# Patient Record
Sex: MALE | Race: WHITE | NOT HISPANIC OR LATINO | Employment: FULL TIME | URBAN - NONMETROPOLITAN AREA
[De-identification: names, ages, dates, MRNs, and addresses within clinical notes are randomized per-mention and may not be internally consistent; named-entity substitution may affect disease eponyms.]

---

## 2017-11-23 ENCOUNTER — HOSPITAL ENCOUNTER (EMERGENCY)
Facility: HOSPITAL | Age: 52
Discharge: HOME | End: 2017-11-23
Attending: FAMILY MEDICINE | Admitting: FAMILY MEDICINE

## 2017-11-23 VITALS
OXYGEN SATURATION: 99 % | HEIGHT: 70 IN | RESPIRATION RATE: 18 BRPM | WEIGHT: 220 LBS | TEMPERATURE: 98 F | SYSTOLIC BLOOD PRESSURE: 140 MMHG | DIASTOLIC BLOOD PRESSURE: 90 MMHG | HEART RATE: 70 BPM | BODY MASS INDEX: 31.5 KG/M2

## 2017-11-23 DIAGNOSIS — R04.0 ANTERIOR EPISTAXIS: Primary | ICD-10-CM

## 2017-11-23 PROCEDURE — 99283 EMERGENCY DEPT VISIT LOW MDM: CPT

## 2017-11-23 RX ORDER — ASPIRIN 81 MG/1
81 TABLET ORAL DAILY
COMMUNITY

## 2017-11-23 RX ORDER — LISINOPRIL 5 MG/1
20 TABLET ORAL 2 TIMES DAILY
COMMUNITY

## 2017-11-23 RX ORDER — PRASUGREL 10 MG/1
10 TABLET, FILM COATED ORAL DAILY
Status: ON HOLD | COMMUNITY
End: 2018-07-17

## 2017-11-23 RX ORDER — OXYMETAZOLINE HYDROCHLORIDE 0.05 G/100ML
1 SPRAY NASAL ONCE
Status: COMPLETED | OUTPATIENT
Start: 2017-11-23 | End: 2017-11-23

## 2017-11-23 RX ORDER — NITROGLYCERIN 0.4 MG/1
0.4 TABLET SUBLINGUAL
COMMUNITY

## 2017-11-23 RX ADMIN — OXYMETAZOLINE HYDROCHLORIDE 1 SPRAY: 5 SPRAY NASAL at 13:15

## 2017-11-23 NOTE — ED PROVIDER NOTES
"Subjective   Patient is a 52 y.o. male presenting with nosebleeds.   History provided by:  Patient   used: No    Nose Bleed   Location:  L nare  Severity:  Moderate  Timing:  Constant  Progression:  Improving  Chronicity:  Recurrent  Context: aspirin use and hypertension    Context: not bleeding disorder and not nose picking    Relieved by:  Nothing  Worsened by:  Nothing  Ineffective treatments:  None tried  Associated symptoms: sneezing        Review of Systems   HENT: Positive for nosebleeds and sneezing.    All other systems reviewed and are negative.      Past Medical History:   Diagnosis Date   • Hyperlipidemia    • Hypertension        Allergies   Allergen Reactions   • Statins        History reviewed. No pertinent surgical history.    History reviewed. No pertinent family history.    Social History     Social History   • Marital status:      Spouse name: N/A   • Number of children: N/A   • Years of education: N/A     Social History Main Topics   • Smoking status: Never Smoker   • Smokeless tobacco: Never Used   • Alcohol use No   • Drug use: No   • Sexual activity: Defer     Other Topics Concern   • None     Social History Narrative   • None     /90 (BP Location: Left arm, Patient Position: Sitting)  Pulse 70  Temp 98 °F (36.7 °C) (Oral)   Resp 18  Ht 70\" (177.8 cm)  Wt 220 lb (99.8 kg)  SpO2 99%  BMI 31.57 kg/m2      Objective   Physical Exam   Constitutional: He is oriented to person, place, and time. He appears well-developed and well-nourished.   HENT:   Head: Normocephalic and atraumatic.   Eyes: EOM are normal. Pupils are equal, round, and reactive to light.   Neck: Normal range of motion. Neck supple.   Cardiovascular: Normal rate, regular rhythm and S1 normal.    Pulmonary/Chest: Effort normal and breath sounds normal.   Abdominal: Soft. Bowel sounds are normal.   Musculoskeletal: Normal range of motion.   Neurological: He is alert and oriented to person, place, " and time. He has normal reflexes.   Nursing note and vitals reviewed.      Procedures         ED Course  ED Course      Afrin was spray on patient left nostril and apply pressure . Patient was observed for 1 hr and bleeding stopped.            MDM    Final diagnoses:   Anterior epistaxis            James Howard MD  11/23/17 2441

## 2018-07-17 ENCOUNTER — HOSPITAL ENCOUNTER (OUTPATIENT)
Facility: HOSPITAL | Age: 53
Setting detail: OBSERVATION
Discharge: HOME | End: 2018-07-18
Attending: HOSPITALIST | Admitting: HOSPITALIST

## 2018-07-17 PROBLEM — I25.10 CAD (CORONARY ARTERY DISEASE): Status: ACTIVE | Noted: 2018-07-17

## 2018-07-17 PROBLEM — R07.9 CHEST PAIN: Status: ACTIVE | Noted: 2018-07-17

## 2018-07-17 PROBLEM — E11.9 TYPE 2 DIABETES MELLITUS: Status: ACTIVE | Noted: 2018-07-17

## 2018-07-17 PROBLEM — I10 HTN (HYPERTENSION): Status: ACTIVE | Noted: 2018-07-17

## 2018-07-17 PROBLEM — E78.5 HLD (HYPERLIPIDEMIA): Status: ACTIVE | Noted: 2018-07-17

## 2018-07-17 LAB
GLUCOSE BLDC GLUCOMTR-MCNC: 128 MG/DL (ref 70–130)
GLUCOSE BLDC GLUCOMTR-MCNC: 208 MG/DL (ref 70–130)

## 2018-07-17 PROCEDURE — G0378 HOSPITAL OBSERVATION PER HR: HCPCS

## 2018-07-17 PROCEDURE — 96372 THER/PROPH/DIAG INJ SC/IM: CPT

## 2018-07-17 PROCEDURE — 25010000002 ENOXAPARIN PER 10 MG: Performed by: HOSPITALIST

## 2018-07-17 PROCEDURE — 82962 GLUCOSE BLOOD TEST: CPT

## 2018-07-17 PROCEDURE — 63710000001 INSULIN ASPART PER 5 UNITS: Performed by: HOSPITALIST

## 2018-07-17 PROCEDURE — 99214 OFFICE O/P EST MOD 30 MIN: CPT | Performed by: INTERNAL MEDICINE

## 2018-07-17 RX ORDER — DIPHENHYDRAMINE HYDROCHLORIDE 50 MG/ML
25 INJECTION INTRAMUSCULAR; INTRAVENOUS ONCE
Status: COMPLETED | OUTPATIENT
Start: 2018-07-18 | End: 2018-07-18

## 2018-07-17 RX ORDER — ATORVASTATIN CALCIUM 20 MG/1
20 TABLET, FILM COATED ORAL NIGHTLY
Status: DISCONTINUED | OUTPATIENT
Start: 2018-07-17 | End: 2018-07-18 | Stop reason: HOSPADM

## 2018-07-17 RX ORDER — METOPROLOL TARTRATE 5 MG/5ML
2.5 INJECTION INTRAVENOUS ONCE AS NEEDED
Status: DISCONTINUED | OUTPATIENT
Start: 2018-07-18 | End: 2018-07-18 | Stop reason: HOSPADM

## 2018-07-17 RX ORDER — ONDANSETRON 2 MG/ML
4 INJECTION INTRAMUSCULAR; INTRAVENOUS EVERY 6 HOURS PRN
Status: DISCONTINUED | OUTPATIENT
Start: 2018-07-17 | End: 2018-07-18 | Stop reason: HOSPADM

## 2018-07-17 RX ORDER — ONDANSETRON 4 MG/1
4 TABLET, FILM COATED ORAL EVERY 6 HOURS PRN
Status: DISCONTINUED | OUTPATIENT
Start: 2018-07-17 | End: 2018-07-18 | Stop reason: HOSPADM

## 2018-07-17 RX ORDER — SODIUM CHLORIDE 0.9 % (FLUSH) 0.9 %
1-10 SYRINGE (ML) INJECTION AS NEEDED
Status: DISCONTINUED | OUTPATIENT
Start: 2018-07-17 | End: 2018-07-18 | Stop reason: HOSPADM

## 2018-07-17 RX ORDER — ASPIRIN 81 MG/1
81 TABLET ORAL DAILY
Status: DISCONTINUED | OUTPATIENT
Start: 2018-07-18 | End: 2018-07-18 | Stop reason: HOSPADM

## 2018-07-17 RX ORDER — NICOTINE POLACRILEX 4 MG
15 LOZENGE BUCCAL
Status: DISCONTINUED | OUTPATIENT
Start: 2018-07-17 | End: 2018-07-18 | Stop reason: HOSPADM

## 2018-07-17 RX ORDER — DEXTROSE MONOHYDRATE 25 G/50ML
25 INJECTION, SOLUTION INTRAVENOUS
Status: DISCONTINUED | OUTPATIENT
Start: 2018-07-17 | End: 2018-07-18 | Stop reason: HOSPADM

## 2018-07-17 RX ORDER — LISINOPRIL 10 MG/1
10 TABLET ORAL 2 TIMES DAILY
Status: DISCONTINUED | OUTPATIENT
Start: 2018-07-17 | End: 2018-07-18 | Stop reason: HOSPADM

## 2018-07-17 RX ORDER — NALOXONE HCL 0.4 MG/ML
0.4 VIAL (ML) INJECTION
Status: DISCONTINUED | OUTPATIENT
Start: 2018-07-17 | End: 2018-07-18 | Stop reason: HOSPADM

## 2018-07-17 RX ORDER — PANTOPRAZOLE SODIUM 40 MG/1
40 TABLET, DELAYED RELEASE ORAL
Status: DISCONTINUED | OUTPATIENT
Start: 2018-07-18 | End: 2018-07-18 | Stop reason: HOSPADM

## 2018-07-17 RX ORDER — ACETAMINOPHEN 325 MG/1
650 TABLET ORAL EVERY 4 HOURS PRN
Status: DISCONTINUED | OUTPATIENT
Start: 2018-07-17 | End: 2018-07-18 | Stop reason: HOSPADM

## 2018-07-17 RX ORDER — ALUMINA, MAGNESIA, AND SIMETHICONE 2400; 2400; 240 MG/30ML; MG/30ML; MG/30ML
15 SUSPENSION ORAL EVERY 6 HOURS PRN
Status: DISCONTINUED | OUTPATIENT
Start: 2018-07-17 | End: 2018-07-18 | Stop reason: HOSPADM

## 2018-07-17 RX ORDER — ATORVASTATIN CALCIUM 20 MG/1
20 TABLET, FILM COATED ORAL NIGHTLY
COMMUNITY

## 2018-07-17 RX ORDER — NITROGLYCERIN 0.4 MG/1
0.4 TABLET SUBLINGUAL
Status: DISCONTINUED | OUTPATIENT
Start: 2018-07-17 | End: 2018-07-18 | Stop reason: HOSPADM

## 2018-07-17 RX ORDER — ONDANSETRON 4 MG/1
4 TABLET, ORALLY DISINTEGRATING ORAL EVERY 6 HOURS PRN
Status: DISCONTINUED | OUTPATIENT
Start: 2018-07-17 | End: 2018-07-18 | Stop reason: HOSPADM

## 2018-07-17 RX ORDER — MORPHINE SULFATE 2 MG/ML
1 INJECTION, SOLUTION INTRAMUSCULAR; INTRAVENOUS EVERY 4 HOURS PRN
Status: DISCONTINUED | OUTPATIENT
Start: 2018-07-17 | End: 2018-07-18 | Stop reason: HOSPADM

## 2018-07-17 RX ADMIN — ENOXAPARIN SODIUM 40 MG: 100 INJECTION SUBCUTANEOUS at 16:09

## 2018-07-17 RX ADMIN — LISINOPRIL 10 MG: 10 TABLET ORAL at 20:52

## 2018-07-17 RX ADMIN — INSULIN ASPART 3 UNITS: 100 INJECTION, SOLUTION INTRAVENOUS; SUBCUTANEOUS at 20:54

## 2018-07-17 RX ADMIN — TICAGRELOR 90 MG: 90 TABLET ORAL at 20:52

## 2018-07-17 NOTE — CONSULTS
Cardiology at [facility]  Cardiovascular Consultation Note      Jose Isaac  304/1  [id number]  1965    DATE OF ADMISSION: 7/17/2018  DATE OF CONSULTATION:  7/17/2018    Anyi Orozco DO  Treatment Team:   Attending Provider: Glenn Yadav MD  Consulting Physician: Franko Mladonado MD  Admitting Provider: Glenn Yadav MD    No chief complaint on file.      Chief complaint/ Reason for Consultation: Chest pain consistent with angina      History of Present Illness  58 years old patient with a BMI 34.6 with history of hypertension, hypertensive heart disease, hyperlipidemia, history of type 2 diabetes, CAD status post PTA stent of RCA in 2015 with a nonobstructive disease in left circumflex system and preserved left ventricle systolic function.  Patient's known to Dr. Galvez and [facility] for evaluation chest pain described as pressure-like feeling 6-7 second of 10 with radiation to the back associated sweating nauseous feeling.  The pain lasted about 7 to 8 minute.  Cardiac enzymes facility reportedly normal.  Patient referred for further evaluation is management.  No orthopnea PND no fever cough which was reported.  No syncope or near syncopal episode or palpitations reported.  EKG also reported to have sinus rhythm without acute ST-T wave changes.  History of intolerance to statin    CONCLUSION:2015  1. Normal left ventricular function with an estimated ejection fraction        of 55% to 60% without regional wall motion abnormalities.  2. Normal right ventricular size with normal function.  3. Normal diastolic function.  4. No significant valvular regurgitation or stenosis.    FINAL IMPRESSION: CATH 10/2015  1.    95% to 99% stenosis in the mid right coronary artery, which was        the culprit vessel.  2.    Luminal irregularity in the circumflex and the diagonal branch.  3.    Preserved left ventricular systolic function with an ejection        fraction  of 55%.  FINAL IMPRESSION:  Successful PTCA and stenting of the right coronary  artery was done with deployment of a 3.5 mm x 18 mm Xpedition stent  with reduction of stenosis from 95% to less than 0% stenosis with good  MALINDA-3 flow.    CARDIAC RISK FACTORS:  1.    Male.  2.    Obesity.  3.    Arterial hypertension.  4.    Hyperlipidemia.  5.    Diabetes.  6.    Family history for coronary artery disease.    Past Medical History:   Diagnosis Date   • Diabetes (CMS/HCC)    • Hyperlipidemia    • Hypertension        Past Surgical History:   Procedure Laterality Date   • CHOLECYSTECTOMY     • CORONARY ANGIOPLASTY WITH STENT PLACEMENT         Allergies   Allergen Reactions   • Statins Unknown (See Comments)     Taking lipitor       No current facility-administered medications on file prior to encounter.      Current Outpatient Prescriptions on File Prior to Encounter   Medication Sig Dispense Refill   • aspirin 81 MG EC tablet Take 81 mg by mouth Daily.     • lisinopril (PRINIVIL,ZESTRIL) 5 MG tablet Take 10 mg by mouth 2 (Two) Times a Day.     • metoprolol tartrate (LOPRESSOR) 25 MG tablet Take 25 mg by mouth Daily.     • nitroglycerin (NITROSTAT) 0.4 MG SL tablet Place 0.4 mg under the tongue Every 5 (Five) Minutes As Needed for Chest Pain. Take no more than 3 doses in 15 minutes.     • SITagliptin-MetFORMIN HCl ER (JANUMET XR) 100-1000 MG tablet sustained-release 24 hour Take 1 tablet by mouth Daily.     • [DISCONTINUED] prasugrel (EFFIENT) 10 MG tablet Take 10 mg by mouth Daily.         Social History     Social History   • Marital status:      Spouse name: N/A   • Number of children: N/A   • Years of education: N/A     Occupational History   • Not on file.     Social History Main Topics   • Smoking status: Never Smoker   • Smokeless tobacco: Never Used   • Alcohol use No   • Drug use: No   • Sexual activity: Defer     Other Topics Concern   • Not on file     Social History Narrative   • No narrative on file  "          REVIEW OF SYSTEMS:   ROS  See history of present illness and past medical history.  Patient denies headache, dizziness, syncope, falls, trauma, change in vision, change in hearing, change in taste, changes in weight, changes in appetite, focal weakness, numbness, or paresthesia.  Patient denies  palpitations,  orthopnea, PND, cough, sinus pressure, rhinorrhea, epistaxis, hemoptysis, hematemesis, diarrhea, constipation or hematchezia.  Denies cold or heat intolerance, polydipsia, polyuria, polyphagia. Denies hematuria, pyuria, dysuria, hesitancy, frequency or urgency.  Denies fever, chills,  night sweats.   Remainder of ROS is negative     Objective:     Vitals:    07/17/18 1311 07/17/18 1406 07/17/18 1640   BP: 143/69     BP Location: Right arm     Patient Position: Sitting     Pulse: 70  81   Resp: 20     Temp:  98.3 °F (36.8 °C)    TempSrc:  Oral    SpO2: 98%     Weight: 109 kg (241 lb 6.4 oz)     Height: 177.8 cm (70\")       Body mass index is 34.64 kg/m².  Flowsheet Rows      First Filed Value   Admission Height  177.8 cm (70\") Documented at 07/17/2018 1311   Admission Weight  109 kg (241 lb 6.4 oz) Documented at 07/17/2018 1311        No intake or output data in the 24 hours ending 07/17/18 1734      Physical Exam   Physical Exam  Alert, cooperative, no distress, appears stated age    Head:    Normocephalic, without obvious abnormality, atraumatic   Eyes:    PERRL, conjunctiva/corneas clear, EOM's intact, both eyes   Ears:    Normal external ear canals, both ears   Nose:   Nares normal, septum midline, mucosa normal, no drainage    or sinus tenderness   Throat:   Lips, mucosa, and tongue normal   Neck:   Supple, symmetrical, trachea midline, no adenopathy;     thyroid:  no enlargement/tenderness/nodules; no carotid    bruit or JVD   Back:     Symmetric, no curvature, ROM normal, no CVA tenderness   Lungs:     Clear to auscultation bilaterally, respirations unlabored   Chest Wall:    No tenderness or " deformity    Heart:    Regular rate and rhythm, S1 and S2 normal, no murmur, rub   or gallop   Abdomen:     Soft, non-tender, bowel sounds active all four quadrants,     no masses, no hepatomegaly, no splenomegaly   Extremities:   Extremities normal, atraumatic, no cyanosis or edema   Pulses:   2+ and symmetric all extremities   Skin:   Skin color, texture, turgor normal, no rashes or lesions   Lymph nodes:   Cervical, supraclavicular, and axillary nodes normal   Neurologic:   CNII-XII intact, normal strength, sensation intact throughout       Radiology Review    No orders to display       Lab Results:  Lab Results (last 24 hours)     Procedure Component Value Units Date/Time    POC Glucose Once [[id number]]  (Normal) Collected:  07/17/18 1549    Specimen:  Blood Updated:  07/17/18 1602     Glucose 128 mg/dL      Comment: RN NotifiedMeter: [id number]: [id number] CHARLY KIRAN I personally viewed and interpreted the patient's EKG/Telemetry data       Assessment/Plan:     Chest pain consistent with angina #2 CAD status post PTCA stent of RCA with luminal irregularity of left circumflex system #3 hypertension with hypertensive heart disease #4 hyperlipidemia #5 diabetes and obesity with BMI 34 and metabolic syndrome  58 years old patient with history of metabolic syndrome, hypertension, hyperlipidemia, diabetes, documented history of CAD status post PTA stent in 2015 showed edema today for evaluation chest pain consistent with angina with the symptom of diaphoresis nauseous and radiation to the left upper extremity.  EKG and cardiac enzymes were reported normal.  Given the clinical description and risk factor and seemed appropriate to further risk stratify with a CT coronary angiogram.  Pros and cons of this option discussed with the patient and family.  Understand willing to proceed forward.    Risk factor lifestyle modification discussed.  Given the metabolic syndrome, Dashdiet, low  carbohydrate and low fat diet discussed.  At present patient is hemodynamically stable and being treated with atorvastatin for management of hyperlipidemia, Brilinta And aspirin with history of bradycardia stent of RCA and to done in 2015, lisinopril for management of hypertension with hypertensive heart disease good blood pressure control and metoprolol with history of  CAD .  Further recommendation based on the outcome of CT coronary angiogram and patient clinical conditions.       Thank you for allowing me to participate in the care of Jose Isaac. Feel free to contact me directly with any further questions or concerns.    Franko Maldonado MD  07/17/18  5:34 PM.      EMR Dragon/Transcription disclaimer:   Much of this encounter note is an electronic transcription/translation of spoken language to printed text. The electronic translation of spoken language may permit erroneous, or at times, nonsensical words or phrases to be inadvertently transcribed; Although I have reviewed the note for such errors, some may still exist.

## 2018-07-17 NOTE — PLAN OF CARE
Problem: Patient Care Overview  Goal: Plan of Care Review  Outcome: Ongoing (interventions implemented as appropriate)   07/17/18 1410   Coping/Psychosocial   Plan of Care Reviewed With patient   Plan of Care Review   Progress no change   OTHER   Outcome Summary initial assessment

## 2018-07-17 NOTE — H&P
HISTORY AND PHYSICAL   [facility]        Patient Identification:  Name: Jose Isaac  Age: 52 y.o.  Sex: male  :  1965  MRN: [id number]                     Primary Care Physician: Anyi Orozco DO    Chief Complaint:  Chest pain    History of Present Illness:          The patient is a 52-year-old white male with history of hypertension, hyperlipidemia, diabetes mellitus and coronary artery disease with history of prior stenting who is admitted with history of having a lot of symptoms of indigestion over the weekend with some nausea and vomiting and reflux symptoms.  The day prior to admission he had some chest discomfort with some radiation into his neck.  It lasted about 5 or 10 minutes and he got a little sweaty.  He went to the emergency room at [facility] in [place] and was held overnight with serial cardiac enzymes being negative.  His EKG did not show any acute changes.  He was transferred for her to [facility] for further evaluation treatment.  He has cardiologist here Dr. Galvez.     Past Medical History:  Past Medical History:   Diagnosis Date   • Diabetes (CMS/[facility])    • Hyperlipidemia    • Hypertension      Past Surgical History:  Past Surgical History:   Procedure Laterality Date   • CHOLECYSTECTOMY     • CORONARY ANGIOPLASTY WITH STENT PLACEMENT        Home Meds:  Prescriptions Prior to Admission   Medication Sig Dispense Refill Last Dose   • aspirin 81 MG EC tablet Take 81 mg by mouth Daily.      • atorvastatin (LIPITOR) 20 MG tablet Take 20 mg by mouth Every Night.      • lisinopril (PRINIVIL,ZESTRIL) 5 MG tablet Take 10 mg by mouth 2 (Two) Times a Day.      • metoprolol tartrate (LOPRESSOR) 25 MG tablet Take 25 mg by mouth Daily.      • nitroglycerin (NITROSTAT) 0.4 MG SL tablet Place 0.4 mg under the tongue Every 5 (Five) Minutes As Needed for Chest Pain. Take no more than 3 doses in 15 minutes.      • SITagliptin-MetFORMIN HCl ER (JANUMET XR)  100-1000 MG tablet sustained-release 24 hour Take 1 tablet by mouth Daily.      • ticagrelor (BRILINTA) 90 MG tablet tablet Take 90 mg by mouth 2 (Two) Times a Day.        CURRENT MEDS    Current Facility-Administered Medications:   •  acetaminophen (TYLENOL) tablet 650 mg, 650 mg, Oral, Q4H PRN, Glenn Yadav MD  •  aluminum-magnesium hydroxide-simethicone (MAALOX MAX) 400-400-40 MG/5ML suspension 15 mL, 15 mL, Oral, Q6H PRN, Glenn Yadav MD  •  [START ON 7/18/2018] aspirin EC tablet 81 mg, 81 mg, Oral, Daily, Glenn Yadav MD  •  atorvastatin (LIPITOR) tablet 20 mg, 20 mg, Oral, Nightly, Glenn Yadav MD  •  dextrose (D50W) solution 25 g, 25 g, Intravenous, Q15 Min PRN, Glenn Yadav MD  •  dextrose (GLUTOSE) oral gel 15 g, 15 g, Oral, Q15 Min PRN, Glenn Yadav MD  •  enoxaparin (LOVENOX) syringe 40 mg, 40 mg, Subcutaneous, Q24H, Glenn Yadav MD  •  glucagon (human recombinant) (GLUCAGEN DIAGNOSTIC) injection 1 mg, 1 mg, Subcutaneous, PRN, Glenn Yadav MD  •  insulin aspart (novoLOG) injection 0-7 Units, 0-7 Units, Subcutaneous, 4x Daily AC & at Bedtime, Glenn Yadav MD  •  [START ON 7/18/2018] linagliptin (TRADJENTA) 5 mg, metFORMIN ER (GLUCOPHAGE-XR) 1,000 mg for JENTADUETO XR 5-1000, , Oral, Daily, Glenn Yadav MD  •  lisinopril (PRINIVIL,ZESTRIL) tablet 10 mg, 10 mg, Oral, BID, Glenn Yadav MD  •  [START ON 7/18/2018] metoprolol tartrate (LOPRESSOR) tablet 25 mg, 25 mg, Oral, Daily, Glenn Yadav MD  •  morphine injection 1 mg, 1 mg, Intravenous, Q4H PRN **AND** naloxone (NARCAN) injection 0.4 mg, 0.4 mg, Intravenous, Q5 Min PRN, Glenn Yadav MD  •  nitroglycerin (NITROSTAT) SL tablet 0.4 mg, 0.4 mg, Sublingual, Q5 Min PRN, Glenn Yadav MD  •  ondansetron (ZOFRAN) tablet 4 mg, 4 mg, Oral, Q6H PRN **OR** ondansetron ODT (ZOFRAN-ODT) disintegrating tablet 4 mg, 4 mg, Oral, Q6H PRN **OR** ondansetron (ZOFRAN) injection 4 mg, 4 mg, Intravenous, Q6H PRN, Glenn Yadav MD  •  [START ON 7/18/2018]  pantoprazole (PROTONIX) EC tablet 40 mg, 40 mg, Oral, Q AM, Glenn Yadav MD  •  sodium chloride 0.9 % flush 1-10 mL, 1-10 mL, Intravenous, PRN, Glenn Yadav MD  •  sodium chloride 0.9 % flush 1-10 mL, 1-10 mL, Intravenous, PRN, Glenn Yadav MD  •  ticagrelor (BRILINTA) tablet 90 mg, 90 mg, Oral, BID, Glenn Yadav MD  Allergies:  Allergies   Allergen Reactions   • Statins Unknown (See Comments)     Taking lipitor     Immunizations:    There is no immunization history on file for this patient.  Social History:   Social History     Social History Narrative   • No narrative on file     Social History     Social History   • Marital status:      Spouse name: N/A   • Number of children: N/A   • Years of education: N/A     Occupational History   • Not on file.     Social History Main Topics   • Smoking status: Never Smoker   • Smokeless tobacco: Never Used   • Alcohol use No   • Drug use: No   • Sexual activity: Defer     Other Topics Concern   • Not on file     Social History Narrative   • No narrative on file       Family History:  History reviewed. No pertinent family history.     Review of Systems  See history of present illness and past medical history.  Patient denies headache, dizziness, syncope, falls, trauma, change in vision, change in hearing, change in taste, changes in weight, changes in appetite, focal weakness, numbness, or paresthesia.  Patient denies  palpitations,  orthopnea, PND, cough, sinus pressure, rhinorrhea, epistaxis, hemoptysis, hematemesis, diarrhea, constipation or hematchezia.  Denies cold or heat intolerance, polydipsia, polyuria, polyphagia. Denies hematuria, pyuria, dysuria, hesitancy, frequency or urgency.  Denies fever, chills,  night sweats.   Remainder of ROS is negative.    Objective:  tMax 24 hrs: Temp (24hrs), Av.3 °F (36.8 °C), Min:98.3 °F (36.8 °C), Max:98.3 °F (36.8 °C)    Vitals Ranges:   Temp:  [98.3 °F (36.8 °C)] 98.3 °F (36.8 °C)  Heart Rate:  [70] 70  Resp:   "[20] 20  BP: (143)/(69) 143/69      Exam:  /69 (BP Location: Right arm, Patient Position: Sitting)   Pulse 70   Temp 98.3 °F (36.8 °C) (Oral)   Resp 20   Ht 177.8 cm (70\")   Wt 109 kg (241 lb 6.4 oz)   SpO2 98%   BMI 34.64 kg/m²     General Appearance:    Alert, cooperative, no distress, appears stated age   Head:    Normocephalic, without obvious abnormality, atraumatic   Eyes:    PERRL, conjunctiva/corneas clear, EOM's intact, both eyes   Ears:    Normal external ear canals, both ears   Nose:   Nares normal, septum midline, mucosa normal, no drainage    or sinus tenderness   Throat:   Lips, mucosa, and tongue normal   Neck:   Supple, symmetrical, trachea midline, no adenopathy;     thyroid:  no enlargement/tenderness/nodules; no carotid    bruit or JVD   Back:     Symmetric, no curvature, ROM normal, no CVA tenderness   Lungs:     Clear to auscultation bilaterally, respirations unlabored   Chest Wall:    No tenderness or deformity    Heart:    Regular rate and rhythm, S1 and S2 normal, no murmur, rub   or gallop   Abdomen:     Soft, non-tender, bowel sounds active all four quadrants,     no masses, no hepatomegaly, no splenomegaly   Extremities:   Extremities normal, atraumatic, no cyanosis or edema   Pulses:   2+ and symmetric all extremities   Skin:   Skin color, texture, turgor normal, no rashes or lesions   Lymph nodes:   Cervical, supraclavicular, and axillary nodes normal   Neurologic:   CNII-XII intact, normal strength, sensation intact throughout      .    Data Review:3 troponins were within normal range, EKG showed sinus rhythm without any acute changes.  Hemoglobin 13.8 hematocrit 40.9 white count 9.7 platelet count 244,000 sodium 137 potassium 4.2 chloride 103B UN 15 creatinine 0.76 glucose 144  Lab Results (last 72 hours)     ** No results found for the last 72 hours. **                   Imaging Results (all)     None        Patient Active Problem List   Diagnosis Code   • Chest pain " R07.9   • HTN (hypertension) I10   • HLD (hyperlipidemia) E78.5   • Type 2 diabetes mellitus (CMS/HCC) E11.9   • CAD (coronary artery disease), hx of stent I25.10       Assessment:  Active Hospital Problems    Diagnosis Date Noted   • **Chest pain [R07.9] 07/17/2018   • HTN (hypertension) [I10] 07/17/2018   • HLD (hyperlipidemia) [E78.5] 07/17/2018   • Type 2 diabetes mellitus (CMS/HCC) [E11.9] 07/17/2018   • CAD (coronary artery disease), hx of stent [I25.10] 07/17/2018      Resolved Hospital Problems    Diagnosis Date Noted Date Resolved   No resolved problems to display.       Plan:  The patient's admitted to the hospital and will consult cardiology for further evaluation.  His pain is somewhat atypical and probably should at least have stress test and if this is negative he may need GI workup with EGD as outpatient.  We'll repeat some more labs and also started on medication for acid suppression for reflux type symptoms.    Glenn Yadav MD  7/17/2018  3:39 PM

## 2018-07-18 ENCOUNTER — APPOINTMENT (OUTPATIENT)
Dept: CT IMAGING | Facility: HOSPITAL | Age: 53
End: 2018-07-18

## 2018-07-18 VITALS
RESPIRATION RATE: 18 BRPM | WEIGHT: 241.8 LBS | OXYGEN SATURATION: 98 % | HEIGHT: 70 IN | BODY MASS INDEX: 34.62 KG/M2 | TEMPERATURE: 98.4 F | DIASTOLIC BLOOD PRESSURE: 72 MMHG | SYSTOLIC BLOOD PRESSURE: 128 MMHG | HEART RATE: 75 BPM

## 2018-07-18 PROBLEM — R07.9 CHEST PAIN: Status: RESOLVED | Noted: 2018-07-17 | Resolved: 2018-07-18

## 2018-07-18 LAB
ANION GAP SERPL CALCULATED.3IONS-SCNC: 7 MMOL/L (ref 5–15)
ARTICHOKE IGE QN: 124 MG/DL (ref 1–129)
BASOPHILS # BLD AUTO: 0.03 10*3/MM3 (ref 0–0.2)
BASOPHILS NFR BLD AUTO: 0.4 % (ref 0–2)
BUN BLD-MCNC: 14 MG/DL (ref 7–21)
BUN/CREAT SERPL: 17.9 (ref 7–25)
CALCIUM SPEC-SCNC: 8.7 MG/DL (ref 8.4–10.2)
CHLORIDE SERPL-SCNC: 104 MMOL/L (ref 95–110)
CHOLEST SERPL-MCNC: 185 MG/DL (ref 0–199)
CO2 SERPL-SCNC: 25 MMOL/L (ref 22–31)
CREAT BLD-MCNC: 0.78 MG/DL (ref 0.7–1.3)
DEPRECATED RDW RBC AUTO: 41.7 FL (ref 35.1–43.9)
EOSINOPHIL # BLD AUTO: 0.19 10*3/MM3 (ref 0–0.7)
EOSINOPHIL NFR BLD AUTO: 2.3 % (ref 0–7)
ERYTHROCYTE [DISTWIDTH] IN BLOOD BY AUTOMATED COUNT: 12.9 % (ref 11.5–14.5)
GFR SERPL CREATININE-BSD FRML MDRD: 105 ML/MIN/1.73 (ref 56–130)
GLUCOSE BLD-MCNC: 121 MG/DL (ref 60–100)
GLUCOSE BLDC GLUCOMTR-MCNC: 119 MG/DL (ref 70–130)
GLUCOSE BLDC GLUCOMTR-MCNC: 224 MG/DL (ref 70–130)
HBA1C MFR BLD: 7.2 % (ref 4–5.6)
HCT VFR BLD AUTO: 38.9 % (ref 39–49)
HDLC SERPL-MCNC: 35 MG/DL (ref 60–200)
HGB BLD-MCNC: 13.2 G/DL (ref 13.7–17.3)
IMM GRANULOCYTES # BLD: 0.08 10*3/MM3 (ref 0–0.02)
IMM GRANULOCYTES NFR BLD: 1 % (ref 0–0.5)
LDLC/HDLC SERPL: 3.65 {RATIO} (ref 0–3.55)
LYMPHOCYTES # BLD AUTO: 1.74 10*3/MM3 (ref 0.6–4.2)
LYMPHOCYTES NFR BLD AUTO: 20.8 % (ref 10–50)
MCH RBC QN AUTO: 29.7 PG (ref 26.5–34)
MCHC RBC AUTO-ENTMCNC: 33.9 G/DL (ref 31.5–36.3)
MCV RBC AUTO: 87.6 FL (ref 80–98)
MONOCYTES # BLD AUTO: 0.83 10*3/MM3 (ref 0–0.9)
MONOCYTES NFR BLD AUTO: 9.9 % (ref 0–12)
NEUTROPHILS # BLD AUTO: 5.48 10*3/MM3 (ref 2–8.6)
NEUTROPHILS NFR BLD AUTO: 65.6 % (ref 37–80)
PLATELET # BLD AUTO: 231 10*3/MM3 (ref 150–450)
PMV BLD AUTO: 10.4 FL (ref 8–12)
POTASSIUM BLD-SCNC: 3.7 MMOL/L (ref 3.5–5.1)
RBC # BLD AUTO: 4.44 10*6/MM3 (ref 4.37–5.74)
SODIUM BLD-SCNC: 136 MMOL/L (ref 137–145)
TRIGL SERPL-MCNC: 111 MG/DL (ref 20–199)
TROPONIN I SERPL-MCNC: <0.012 NG/ML
WBC NRBC COR # BLD: 8.35 10*3/MM3 (ref 3.2–9.8)

## 2018-07-18 PROCEDURE — G0378 HOSPITAL OBSERVATION PER HR: HCPCS

## 2018-07-18 PROCEDURE — 25010000002 DIPHENHYDRAMINE PER 50 MG: Performed by: INTERNAL MEDICINE

## 2018-07-18 PROCEDURE — 96374 THER/PROPH/DIAG INJ IV PUSH: CPT

## 2018-07-18 PROCEDURE — 84484 ASSAY OF TROPONIN QUANT: CPT | Performed by: HOSPITALIST

## 2018-07-18 PROCEDURE — 75574 CT ANGIO HRT W/3D IMAGE: CPT

## 2018-07-18 PROCEDURE — 82962 GLUCOSE BLOOD TEST: CPT

## 2018-07-18 PROCEDURE — 83036 HEMOGLOBIN GLYCOSYLATED A1C: CPT | Performed by: HOSPITALIST

## 2018-07-18 PROCEDURE — 63710000001 INSULIN ASPART PER 5 UNITS: Performed by: HOSPITALIST

## 2018-07-18 PROCEDURE — 80061 LIPID PANEL: CPT | Performed by: HOSPITALIST

## 2018-07-18 PROCEDURE — 80048 BASIC METABOLIC PNL TOTAL CA: CPT | Performed by: HOSPITALIST

## 2018-07-18 PROCEDURE — 96372 THER/PROPH/DIAG INJ SC/IM: CPT

## 2018-07-18 PROCEDURE — 0 IOPAMIDOL PER 1 ML: Performed by: HOSPITALIST

## 2018-07-18 PROCEDURE — 25010000002 ENOXAPARIN PER 10 MG: Performed by: HOSPITALIST

## 2018-07-18 PROCEDURE — 85025 COMPLETE CBC W/AUTO DIFF WBC: CPT | Performed by: HOSPITALIST

## 2018-07-18 PROCEDURE — 99214 OFFICE O/P EST MOD 30 MIN: CPT | Performed by: INTERNAL MEDICINE

## 2018-07-18 RX ORDER — ISOSORBIDE MONONITRATE 30 MG/1
30 TABLET, EXTENDED RELEASE ORAL
Status: DISCONTINUED | OUTPATIENT
Start: 2018-07-18 | End: 2018-07-18 | Stop reason: HOSPADM

## 2018-07-18 RX ORDER — ISOSORBIDE MONONITRATE 30 MG/1
30 TABLET, EXTENDED RELEASE ORAL DAILY
Qty: 30 TABLET | Refills: 1 | Status: SHIPPED | OUTPATIENT
Start: 2018-07-18

## 2018-07-18 RX ADMIN — INSULIN ASPART 3 UNITS: 100 INJECTION, SOLUTION INTRAVENOUS; SUBCUTANEOUS at 11:05

## 2018-07-18 RX ADMIN — ASPIRIN 81 MG: 81 TABLET, COATED ORAL at 08:05

## 2018-07-18 RX ADMIN — ISOSORBIDE MONONITRATE 30 MG: 30 TABLET, EXTENDED RELEASE ORAL at 17:31

## 2018-07-18 RX ADMIN — PANTOPRAZOLE SODIUM 40 MG: 40 TABLET, DELAYED RELEASE ORAL at 05:55

## 2018-07-18 RX ADMIN — METOPROLOL TARTRATE 25 MG: 25 TABLET ORAL at 08:05

## 2018-07-18 RX ADMIN — ATORVASTATIN CALCIUM 20 MG: 20 TABLET, FILM COATED ORAL at 08:05

## 2018-07-18 RX ADMIN — ENOXAPARIN SODIUM 40 MG: 100 INJECTION SUBCUTANEOUS at 15:08

## 2018-07-18 RX ADMIN — DIPHENHYDRAMINE HYDROCHLORIDE 25 MG: 50 INJECTION INTRAMUSCULAR; INTRAVENOUS at 05:55

## 2018-07-18 RX ADMIN — TICAGRELOR 90 MG: 90 TABLET ORAL at 08:05

## 2018-07-18 RX ADMIN — IOPAMIDOL 90 ML: 755 INJECTION, SOLUTION INTRAVENOUS at 09:37

## 2018-07-18 RX ADMIN — LISINOPRIL 10 MG: 10 TABLET ORAL at 08:05

## 2018-07-18 NOTE — PLAN OF CARE
Problem: Patient Care Overview  Goal: Plan of Care Review  Outcome: Ongoing (interventions implemented as appropriate)   07/18/18 0611   Coping/Psychosocial   Plan of Care Reviewed With patient;spouse     Goal: Individualization and Mutuality  Outcome: Ongoing (interventions implemented as appropriate)    Goal: Discharge Needs Assessment  Outcome: Ongoing (interventions implemented as appropriate)    Goal: Interprofessional Rounds/Family Conf  Outcome: Ongoing (interventions implemented as appropriate)      Problem: Cardiac: ACS (Acute Coronary Syndrome) (Adult)  Goal: Signs and Symptoms of Listed Potential Problems Will be Absent, Minimized or Managed (Cardiac: ACS)  Outcome: Ongoing (interventions implemented as appropriate)

## 2018-07-18 NOTE — PROGRESS NOTES
LOS: 0 days   Patient Care Team:  Anyi Orozco DO as PCP - General    Chief Complaint:  Admitted for evaluation chest pain with history of CAD       Review of Systems:   ROS    .  Patient denies headache, dizziness,  change in vision, change in hearing, change in taste, changes in weight, changes in appetite, focal weakness, numbness, or paresthesia.  Patient denies  palpitations,  orthopnea, PND, cough, sinus pressure, rhinorrhea, epistaxis, hemoptysis, hematemesis, diarrhea, constipation or hematchezia.  Denies cold or heat intolerance, polydipsia, polyuria, polyphagia. Denies hematuria, pyuria, dysuria, hesitancy, frequency or urgency.  Denies fever, chills,  night sweats.   Objective     Current Facility-Administered Medications   Medication Dose Route Frequency Provider Last Rate Last Dose   • acetaminophen (TYLENOL) tablet 650 mg  650 mg Oral Q4H PRN Glenn Yadav MD       • aluminum-magnesium hydroxide-simethicone (MAALOX MAX) 400-400-40 MG/5ML suspension 15 mL  15 mL Oral Q6H PRN Glenn Yadav MD       • aspirin EC tablet 81 mg  81 mg Oral Daily Glenn Yadav MD       • atorvastatin (LIPITOR) tablet 20 mg  20 mg Oral Nightly Glenn Yadav MD       • dextrose (D50W) solution 25 g  25 g Intravenous Q15 Min PRN Glenn Yadav MD       • dextrose (GLUTOSE) oral gel 15 g  15 g Oral Q15 Min PRN Glenn Yadav MD       • enoxaparin (LOVENOX) syringe 40 mg  40 mg Subcutaneous Q24H Glenn Yadav MD   40 mg at 07/17/18 1609   • glucagon (human recombinant) (GLUCAGEN DIAGNOSTIC) injection 1 mg  1 mg Subcutaneous PRN Glenn Yadav MD       • insulin aspart (novoLOG) injection 0-7 Units  0-7 Units Subcutaneous 4x Daily AC & at Bedtime Glenn Yadav MD   3 Units at 07/17/18 2054   • linagliptin (TRADJENTA) 5 mg, metFORMIN ER (GLUCOPHAGE-XR) 1,000 mg for JENTADUETO XR 5-1000   Oral Daily Glenn Yadav MD       • lisinopril (PRINIVIL,ZESTRIL) tablet 10 mg  10 mg Oral BID Glenn Yadav MD   10 mg at 07/17/18 2052   •  "metoprolol tartrate (LOPRESSOR) injection 2.5 mg  2.5 mg Intravenous Once PRN Franko Maldonado MD       • metoprolol tartrate (LOPRESSOR) tablet 25 mg  25 mg Oral Daily Glenn Yadav MD       • morphine injection 1 mg  1 mg Intravenous Q4H PRN Glenn Yadav MD        And   • naloxone (NARCAN) injection 0.4 mg  0.4 mg Intravenous Q5 Min PRN Glenn Yadav MD       • nitroglycerin (NITROSTAT) SL tablet 0.4 mg  0.4 mg Sublingual Q5 Min PRN Glenn Yadav MD       • ondansetron (ZOFRAN) tablet 4 mg  4 mg Oral Q6H PRN Glenn Yadav MD        Or   • ondansetron ODT (ZOFRAN-ODT) disintegrating tablet 4 mg  4 mg Oral Q6H PRN Glenn Yadav MD        Or   • ondansetron (ZOFRAN) injection 4 mg  4 mg Intravenous Q6H PRN Glenn Yadav MD       • pantoprazole (PROTONIX) EC tablet 40 mg  40 mg Oral Q AM Glenn Yadav MD   40 mg at 07/18/18 0555   • sodium chloride 0.9 % flush 1-10 mL  1-10 mL Intravenous PRN Glenn Yadav MD       • sodium chloride 0.9 % flush 1-10 mL  1-10 mL Intravenous PRN Glenn Yadav MD       • ticagrelor (BRILINTA) tablet 90 mg  90 mg Oral BID Glenn Yadav MD   90 mg at 07/17/18 2052       Vital Sign Min/Max for last 24 hours  Temp  Min: 97.5 °F (36.4 °C)  Max: 98.6 °F (37 °C)   BP  Min: 118/57  Max: 143/69   Pulse  Min: 58  Max: 81   Resp  Min: 18  Max: 20   SpO2  Min: 97 %  Max: 100 %   No Data Recorded   Weight  Min: 109 kg (241 lb 6.4 oz)  Max: 110 kg (241 lb 12.8 oz)     Flowsheet Rows      First Filed Value   Admission Height  177.8 cm (70\") Documented at 07/17/2018 1311   Admission Weight  109 kg (241 lb 6.4 oz) Documented at 07/17/2018 1311          No intake or output data in the 24 hours ending 07/18/18 0628    Physical Exam:     General Appearance:    Alert, cooperative, in no acute distress   Lungs:     Clear to auscultation,respirations regular, even and                  unlabored    Heart:    Regular rhythm and normal rate, normal S1 and S2, no            murmur, no gallop, no rub, no click "   Chest Wall:    No abnormalities observed   Abdomen:     Normal bowel sounds, no masses, no organomegaly, soft        non-tender, non-distended, no guarding, no rebound                tenderness   Extremities:   Moves all extremities well, no edema, no cyanosis, no             redness   Pulses:   Pulses palpable and equal bilaterally   Skin:   No bleeding, bruising or rash        Results Review:   I reviewed the patient's new clinical results.  Lab Results   Component Value Date    WBC 8.35 07/18/2018    HGB 13.2 (L) 07/18/2018    HCT 38.9 (L) 07/18/2018    MCV 87.6 07/18/2018     07/18/2018     Lab Results   Component Value Date    GLUCOSE 121 (H) 07/18/2018    BUN 14 07/18/2018    CREATININE 0.78 07/18/2018    EGFRIFNONA 105 07/18/2018    BCR 17.9 07/18/2018    CO2 25.0 07/18/2018    CALCIUM 8.7 07/18/2018    ALBUMIN 4.3 10/27/2015    AST 21 10/27/2015    ALT 51 10/27/2015     No results found for: TSH  No results found for: INR, PROTIME  No results found for: PTT    EKG:     Telemetry:    Ejection Fraction  No results found for: EF    Echo EF Estimated  No results found for: ECHOEFEST      Present on Admission:  • Chest pain    Assessment/Plan        Chest pain consistent with angina #2 CAD status post PTCA stent of RCA with luminal irregularity of left circumflex system #3 hypertension with hypertensive heart disease #4 hyperlipidemia #5 diabetes and obesity with BMI 34 and metabolic syndrome       doing better at the time of evaluation.  He is waiting for CT coronary angiogram.  Went to continue aspirin and Brilinta with history of PTA stent, lisinopril for management of hypertension hypertensive heart disease, total atorvastatin for hyperlipidemia and diabetes being treated with oral hypoglycemics and sliding insulin scale.  Risk factor lifestyle modification discussed.Dash Diet, low carbohydrate and low fat diet explained.  Further recommendation depending on outcome of CT coronary  angiogram    Franko Maldonado MD  07/18/18  7:33 AM      EMR Dragon/Transcription disclaimer:   Much of this encounter note is an electronic transcription/translation of spoken language to printed text. The electronic translation of spoken language may permit erroneous, or at times, nonsensical words or phrases to be inadvertently transcribed; Although I have reviewed the note for such errors, some may still exist.

## 2018-07-18 NOTE — DISCHARGE SUMMARY
[facility] Medicine Services  DISCHARGE SUMMARY       Date of Admission: 7/17/2018  Date of Discharge:  7/18/2018  Primary Care Physician: Anyi Orozco DO    Presenting Problem/History of Present Illness:  Chest pain [R07.9]     Final Discharge Diagnoses:  Hospital Problem List     HTN (hypertension)    HLD (hyperlipidemia)    Type 2 diabetes mellitus (CMS/[facility])    CAD (coronary artery disease), hx of stent          Consults:   Consults     Date and Time Order Name Status Description    7/17/2018 1520 Inpatient Cardiology Consult Completed     7/17/2018 1325 Inpatient Cardiology Consult          Pertinent Test Results:     Lab Results (last 24 hours)     Procedure Component Value Units Date/Time    POC Glucose Once [[id number]]  (Abnormal) Collected:  07/18/18 1102    Specimen:  Blood Updated:  07/18/18 1204     Glucose 224 (H) mg/dL      Comment: RN NotifiedMeter: [id number]: [id number] JUNO FLETCHER       POC Glucose Once [[id number]]  (Normal) Collected:  07/18/18 0620    Specimen:  Blood Updated:  07/18/18 0649     Glucose 119 mg/dL      Comment: RN NotifiedMeter: [id number]: [id number] YORDY DICKEYERIE       Troponin [[id number]]  (Normal) Collected:  07/18/18 0514    Specimen:  Blood Updated:  07/18/18 0627     Troponin I <0.012 ng/mL     Lipid Panel [[id number]]  (Abnormal) Collected:  07/18/18 0514    Specimen:  Blood Updated:  07/18/18 0627     Total Cholesterol 185 mg/dL      Triglycerides 111 mg/dL      HDL Cholesterol 35 (L) mg/dL      LDL Cholesterol  124 mg/dL      LDL/HDL Ratio 3.65 (H)    Hemoglobin A1c [[id number]]  (Abnormal) Collected:  07/18/18 0514    Specimen:  Blood Updated:  07/18/18 0627     Hemoglobin A1C 7.2 (H) %     Basic Metabolic Panel [[id number]]  (Abnormal) Collected:  07/18/18 0514    Specimen:  Blood Updated:  07/18/18 0616     Glucose 121 (H) mg/dL      BUN 14 mg/dL      Creatinine 0.78 mg/dL      Sodium 136 (L) mmol/L       Potassium 3.7 mmol/L      Chloride 104 mmol/L      CO2 25.0 mmol/L      Calcium 8.7 mg/dL      eGFR Non African Amer 105 mL/min/1.73      BUN/Creatinine Ratio 17.9     Anion Gap 7.0 mmol/L     CBC & Differential [[id number]] Collected:  07/18/18 0514    Specimen:  Blood Updated:  07/18/18 0600    Narrative:       The following orders were created for panel order CBC & Differential.  Procedure                               Abnormality         Status                     ---------                               -----------         ------                     CBC Auto Differential[[id number]]        Abnormal            Final result                 Please view results for these tests on the individual orders.    CBC Auto Differential [[id number]]  (Abnormal) Collected:  07/18/18 0514    Specimen:  Blood Updated:  07/18/18 0600     WBC 8.35 10*3/mm3      RBC 4.44 10*6/mm3      Hemoglobin 13.2 (L) g/dL      Hematocrit 38.9 (L) %      MCV 87.6 fL      MCH 29.7 pg      MCHC 33.9 g/dL      RDW 12.9 %      RDW-SD 41.7 fl      MPV 10.4 fL      Platelets 231 10*3/mm3      Neutrophil % 65.6 %      Lymphocyte % 20.8 %      Monocyte % 9.9 %      Eosinophil % 2.3 %      Basophil % 0.4 %      Immature Grans % 1.0 (H) %      Neutrophils, Absolute 5.48 10*3/mm3      Lymphocytes, Absolute 1.74 10*3/mm3      Monocytes, Absolute 0.83 10*3/mm3      Eosinophils, Absolute 0.19 10*3/mm3      Basophils, Absolute 0.03 10*3/mm3      Immature Grans, Absolute 0.08 (H) 10*3/mm3     POC Glucose Once [[id number]]  (Abnormal) Collected:  07/17/18 1921    Specimen:  Blood Updated:  07/17/18 2018     Glucose 208 (H) mg/dL      Comment: Result Not ConfirmedMeter: [id number]: [id number] AMIEMISHEL VILLALOBOSRONAK       POC Glucose Once [[id number]]  (Normal) Collected:  07/17/18 1549    Specimen:  Blood Updated:  07/17/18 1602     Glucose 128 mg/dL      Comment: RN NotifiedMeter: [id number]: [id number] [facility]             Imaging Results (last 72  hours)     Procedure Component Value Units Date/Time    CT Angiogram Coronary [[id number]] Collected:  07/18/18 0928     Updated:  07/18/18 1433    Narrative:       PROCEDURE: CT HEART CORONARY ANGIOGRAM WITH IV CONTRAST    CLINICAL HISTORY: Chest pain, chronic, high probability of CAD    COMPARISON: None.    TECHNIQUE:  Images were obtained after premedication with 25 mg PO metoprolol  Serial axial CT images were obtained through the heart at 3 mm  thickness without contrast for calcium scoring.   Subsequently, following the intravenous administration of 90 ml  of Isovue-370, serial axial CT images were obtained through the  heart at 0.6 mm thickness utilizing retrospective  gating.   Post processing was performed by the radiologist at the [employer]  workstation.   3D images including vessel probing technique were also obtained.   Full field of view reconstructed images were used for evaluation  of the extracardiac tissues.    This exam was performed using radiation doses that are as low as  reasonably achievable (ALARA).  This exam was performed according to our departmental dose  optimization program, which includes automated exposure control,  adjustment of the mA and/or KV according to patient size and/or  use of iterative reconstruction technique.  Dose length product: 1043.7 mGy.cm    FINDINGS:  CALCIUM PLAQUE BURDEN:    REGION                                         CALCIUM SCORE  ([place])  Left Main                                                      0   Right Coronary Artery                                 11   Left Anterior Descending                           11   Circumflex                                                   1     Total calcium score is 23    Implication: Definite, at least mild atherosclerotic plaque  Risk of coronary artery disease: Mild or minimal coronary  narrowings likely    CTA OF THE CORONARY ARTERIES:   There is a type II LAD.   Coronary anatomy is right dominant    Left Main:  Patent. No calcified or soft itssue density plaque and  no stenosis.  LAD: Patent. Contains a small amount of atherosclerotic plaque in  the proximal segment resulting in less than 40% stenosis.  Contains a focal area of approximately 50-70% stenosis due to  circumferential noncalcified plaque in the mid segment. The  remainder appears patent distally small vessel size limits  evaluation.  There is a medium to large first diagonal branch/ramus  intermedius which contains a calcific plaque near the origin  resulting in approximately 50% narrowing. The remainder appears  patent.  Circumflex: Patent. Very short proximal segment with an early  trifurcation. No calcified plaque. No definite stenosis.  RCA: Patent. Contains a patent mid segment stent. No calcified or  soft tisue density plaque and no stenosis.    The aortic valve is tricuspid.   There is no myocardial bridging.   On short axis views, the myocardium is homogeneous in thickness.    ADDITIONAL FINDINGS:  Unremarkable    CT FUNCTIONAL ANALYSIS:  Ejection Fraction     72 %  Diastolic Volume     126 ml  Systolic Volume      35 ml  Stroke Volume        91 ml  Cardiac Output        5.3 L/minute      Impression:       CONCLUSION:   1.  Patent RCA stent.  2.  Short segment 50-70% stenosis in the mid segment LAD due to  noncalcified plaque.  3.  Normal ejection fraction.    Electronically signed by:  Abdoulaye Colindres MD  7/18/2018 2:32 PM CDT  Workstation: [id number]        Hospital Course:  The patient is a 52 y.o. male who presented to [facility] with Chest pain.  Chest pain resolved almost immediately and patient had no chest pain while admitted.  Patient underwent CTA of the coronary arteries which revealed a patent stent as well as some mild to moderate coronary artery disease with no significant/critical blockages.  Patient was cleared by cardiology for discharge and follow-up with his regular cardiologist, Dr. Galvez.  Patient was also put on Imdur  "per cardiology recommendations.  Patient was instructed to return with chest pain, shortness of air, nausea, vomiting, dizziness, syncope, presyncope, any other worsening or concerning symptoms.  He was also instructed to withhold his metformin for 48 hours after contrast exposure.    Condition on Discharge:  Stable    Physical Exam on Discharge:  /72 (BP Location: Right arm, Patient Position: Sitting)   Pulse 70   Temp 98.5 °F (36.9 °C) (Temporal Artery )   Resp 18   Ht 177.8 cm (70\")   Wt 110 kg (241 lb 12.8 oz)   SpO2 99%   BMI 34.69 kg/m²   Physical Exam   Constitutional: He is oriented to person, place, and time. He appears well-developed and well-nourished. No distress.   HENT:   Head: Normocephalic and atraumatic.   Cardiovascular: Normal rate and regular rhythm.    Pulmonary/Chest: Effort normal and breath sounds normal. No respiratory distress. He has no wheezes.   Abdominal: Soft. Bowel sounds are normal. He exhibits no distension. There is no tenderness.   Neurological: He is alert and oriented to person, place, and time.   Skin: Skin is warm and dry. Capillary refill takes less than 2 seconds. He is not diaphoretic.   Psychiatric: He has a normal mood and affect. His behavior is normal.     Discharge Disposition:  Home or Self Care    Discharge Medications:     Discharge Medications      Continue These Medications      Instructions Start Date   aspirin 81 MG EC tablet   81 mg, Oral, Daily      atorvastatin 20 MG tablet  Commonly known as:  LIPITOR   20 mg, Oral, Nightly      JANUMET -1000 MG tablet  Generic drug:  SITagliptin-MetFORMIN HCl ER   1 tablet, Oral, Daily      lisinopril 5 MG tablet  Commonly known as:  PRINIVIL,ZESTRIL   10 mg, Oral, 2 Times Daily      metoprolol tartrate 25 MG tablet  Commonly known as:  LOPRESSOR   25 mg, Oral, Daily      nitroglycerin 0.4 MG SL tablet  Commonly known as:  NITROSTAT   0.4 mg, Sublingual, Every 5 Minutes PRN, Take no more than 3 doses in " 15 minutes.       ticagrelor 90 MG tablet tablet  Commonly known as:  BRILINTA   90 mg, Oral, 2 Times Daily           Should also be noted the patient was prescribed Imdur.    Discharge Diet:   Diet Instructions     Diet: Cardiac       Discharge Diet:  Cardiac      ADA/heart healthy    Activity at Discharge:   Activity Instructions     Activity as Tolerated             Discharge Care Plan/Instructions: Follow-up with Dr. Galvez, follow-up with PCP, Imdur, return with any worsening or concerning symptoms.        This document has been electronically signed by HANK Rosado on July 18, 2018 3:57 PM

## 2021-04-07 DIAGNOSIS — Z13.1 SCREENING FOR DIABETES MELLITUS: ICD-10-CM

## 2021-04-07 DIAGNOSIS — I25.10 ATHEROSCLEROSIS OF NATIVE CORONARY ARTERY OF NATIVE HEART, ANGINA PRESENCE UNSPECIFIED: Primary | ICD-10-CM

## 2021-04-07 DIAGNOSIS — E78.49 OTHER HYPERLIPIDEMIA: ICD-10-CM

## 2021-04-07 DIAGNOSIS — Z13.21 ENCOUNTER FOR VITAMIN DEFICIENCY SCREENING: ICD-10-CM

## 2021-04-12 ENCOUNTER — LAB (OUTPATIENT)
Dept: LAB | Facility: HOSPITAL | Age: 56
End: 2021-04-12

## 2021-04-12 DIAGNOSIS — Z13.1 SCREENING FOR DIABETES MELLITUS: ICD-10-CM

## 2021-04-12 DIAGNOSIS — Z13.21 ENCOUNTER FOR VITAMIN DEFICIENCY SCREENING: ICD-10-CM

## 2021-04-12 DIAGNOSIS — I25.10 ATHEROSCLEROSIS OF NATIVE CORONARY ARTERY OF NATIVE HEART, ANGINA PRESENCE UNSPECIFIED: ICD-10-CM

## 2021-04-12 DIAGNOSIS — E78.49 OTHER HYPERLIPIDEMIA: ICD-10-CM

## 2021-04-12 LAB
ALBUMIN SERPL-MCNC: 4.2 G/DL (ref 3.5–5.2)
ALBUMIN/GLOB SERPL: 1.6 G/DL
ALP SERPL-CCNC: 80 U/L (ref 39–117)
ALT SERPL W P-5'-P-CCNC: 28 U/L (ref 1–41)
ANION GAP SERPL CALCULATED.3IONS-SCNC: 10 MMOL/L (ref 5–15)
AST SERPL-CCNC: 15 U/L (ref 1–40)
BASOPHILS # BLD AUTO: 0.1 10*3/MM3 (ref 0–0.2)
BASOPHILS NFR BLD AUTO: 1.1 % (ref 0–1.5)
BILIRUB SERPL-MCNC: 0.4 MG/DL (ref 0–1.2)
BUN SERPL-MCNC: 16 MG/DL (ref 6–20)
BUN/CREAT SERPL: 20.5 (ref 7–25)
CALCIUM SPEC-SCNC: 9.6 MG/DL (ref 8.6–10.5)
CHLORIDE SERPL-SCNC: 103 MMOL/L (ref 98–107)
CHOLEST SERPL-MCNC: 299 MG/DL (ref 0–200)
CO2 SERPL-SCNC: 23 MMOL/L (ref 22–29)
CREAT SERPL-MCNC: 0.78 MG/DL (ref 0.76–1.27)
DEPRECATED RDW RBC AUTO: 39.9 FL (ref 37–54)
EOSINOPHIL # BLD AUTO: 0.13 10*3/MM3 (ref 0–0.4)
EOSINOPHIL NFR BLD AUTO: 1.4 % (ref 0.3–6.2)
ERYTHROCYTE [DISTWIDTH] IN BLOOD BY AUTOMATED COUNT: 13 % (ref 12.3–15.4)
GFR SERPL CREATININE-BSD FRML MDRD: 103 ML/MIN/1.73
GLOBULIN UR ELPH-MCNC: 2.7 GM/DL
GLUCOSE SERPL-MCNC: 190 MG/DL (ref 65–99)
HBA1C MFR BLD: 9.4 % (ref 4.8–5.6)
HCT VFR BLD AUTO: 43.1 % (ref 37.5–51)
HDLC SERPL-MCNC: 49 MG/DL (ref 40–60)
HGB BLD-MCNC: 14.7 G/DL (ref 13–17.7)
IMM GRANULOCYTES # BLD AUTO: 0.12 10*3/MM3 (ref 0–0.05)
IMM GRANULOCYTES NFR BLD AUTO: 1.3 % (ref 0–0.5)
LDLC SERPL CALC-MCNC: 208 MG/DL (ref 0–100)
LDLC/HDLC SERPL: 4.22 {RATIO}
LYMPHOCYTES # BLD AUTO: 1.35 10*3/MM3 (ref 0.7–3.1)
LYMPHOCYTES NFR BLD AUTO: 14.9 % (ref 19.6–45.3)
MCH RBC QN AUTO: 29.2 PG (ref 26.6–33)
MCHC RBC AUTO-ENTMCNC: 34.1 G/DL (ref 31.5–35.7)
MCV RBC AUTO: 85.5 FL (ref 79–97)
MONOCYTES # BLD AUTO: 0.76 10*3/MM3 (ref 0.1–0.9)
MONOCYTES NFR BLD AUTO: 8.4 % (ref 5–12)
NEUTROPHILS NFR BLD AUTO: 6.63 10*3/MM3 (ref 1.7–7)
NEUTROPHILS NFR BLD AUTO: 72.9 % (ref 42.7–76)
NRBC BLD AUTO-RTO: 0 /100 WBC (ref 0–0.2)
PLATELET # BLD AUTO: 254 10*3/MM3 (ref 140–450)
PMV BLD AUTO: 10.1 FL (ref 6–12)
POTASSIUM SERPL-SCNC: 4 MMOL/L (ref 3.5–5.2)
PROT SERPL-MCNC: 6.9 G/DL (ref 6–8.5)
RBC # BLD AUTO: 5.04 10*6/MM3 (ref 4.14–5.8)
SODIUM SERPL-SCNC: 136 MMOL/L (ref 136–145)
TRIGL SERPL-MCNC: 217 MG/DL (ref 0–150)
VLDLC SERPL-MCNC: 42 MG/DL (ref 5–40)
WBC # BLD AUTO: 9.09 10*3/MM3 (ref 3.4–10.8)

## 2021-04-12 PROCEDURE — 80061 LIPID PANEL: CPT

## 2021-04-12 PROCEDURE — 82306 VITAMIN D 25 HYDROXY: CPT

## 2021-04-12 PROCEDURE — 36415 COLL VENOUS BLD VENIPUNCTURE: CPT

## 2021-04-12 PROCEDURE — 85025 COMPLETE CBC W/AUTO DIFF WBC: CPT

## 2021-04-12 PROCEDURE — 80053 COMPREHEN METABOLIC PANEL: CPT

## 2021-04-12 PROCEDURE — 83036 HEMOGLOBIN GLYCOSYLATED A1C: CPT

## 2021-04-13 LAB — 25(OH)D3 SERPL-MCNC: 14.6 NG/ML (ref 30–100)

## 2021-11-23 ENCOUNTER — OFFICE VISIT (OUTPATIENT)
Dept: ENDOCRINOLOGY | Facility: CLINIC | Age: 56
End: 2021-11-23

## 2021-11-23 VITALS
HEART RATE: 97 BPM | DIASTOLIC BLOOD PRESSURE: 72 MMHG | HEIGHT: 70 IN | OXYGEN SATURATION: 98 % | WEIGHT: 256.1 LBS | SYSTOLIC BLOOD PRESSURE: 148 MMHG | BODY MASS INDEX: 36.66 KG/M2

## 2021-11-23 DIAGNOSIS — E78.2 MIXED HYPERLIPIDEMIA: ICD-10-CM

## 2021-11-23 DIAGNOSIS — E11.65 TYPE 2 DIABETES MELLITUS WITH HYPERGLYCEMIA, WITH LONG-TERM CURRENT USE OF INSULIN (HCC): Primary | ICD-10-CM

## 2021-11-23 DIAGNOSIS — I10 PRIMARY HYPERTENSION: ICD-10-CM

## 2021-11-23 DIAGNOSIS — Z79.4 TYPE 2 DIABETES MELLITUS WITH HYPERGLYCEMIA, WITH LONG-TERM CURRENT USE OF INSULIN (HCC): Primary | ICD-10-CM

## 2021-11-23 PROBLEM — E55.9 VITAMIN D DEFICIENCY: Status: ACTIVE | Noted: 2021-11-23

## 2021-11-23 PROCEDURE — 99204 OFFICE O/P NEW MOD 45 MIN: CPT | Performed by: NURSE PRACTITIONER

## 2021-11-23 RX ORDER — INSULIN LISPRO 100 [IU]/ML
INJECTION, SOLUTION INTRAVENOUS; SUBCUTANEOUS
Qty: 6 PEN | Refills: 11 | Status: SHIPPED | OUTPATIENT
Start: 2021-11-23 | End: 2023-01-04

## 2021-11-23 RX ORDER — PEN NEEDLE, DIABETIC 30 GX3/16"
1 NEEDLE, DISPOSABLE MISCELLANEOUS 4 TIMES DAILY
Qty: 120 EACH | Refills: 11 | Status: SHIPPED | OUTPATIENT
Start: 2021-11-23 | End: 2022-12-05

## 2021-11-23 RX ORDER — INSULIN GLARGINE 300 U/ML
50 INJECTION, SOLUTION SUBCUTANEOUS
Qty: 5 PEN | Refills: 11 | Status: SHIPPED | OUTPATIENT
Start: 2021-11-23 | End: 2021-12-17

## 2021-11-23 NOTE — PROGRESS NOTES
"Chief Complaint  Diabetes    Subjective          Jose Isaac presents to [facility] ENDOCRINOLOGY  History of Present Illness      In office visit     Referring provider Dr. Anyi Orozco        Chief Complaint   Patient presents with   • Diabetes         56-year-old male presents for consultation    Reason diabetes mellitus type 2      Duration--diagnosed in 30 s     Context checked a random bg and was 600    Timing --constant     Quality not controlled      Lab Results   Component Value Date    HGBA1C 9.4 10/12/2021         Severity high      Macrovascular complications--CAD      MI, stent     Microvascular complications --neuropathy , no DR , no renal disease         Current diabetes regimen     Taking janumet 100/1000 mg daily       Current glucose monitoring     Fingerstick     Checks once to twice a day     Running 200 in the am           Review of Systems - General ROS: negative          Objective   Vital Signs:   /72   Pulse 97   Ht 177.8 cm (70\")   Wt 116 kg (256 lb 1.6 oz)   SpO2 98%   BMI 36.75 kg/m²     Physical Exam  Constitutional:       Appearance: Normal appearance.   Cardiovascular:      Rate and Rhythm: Regular rhythm.      Heart sounds: Normal heart sounds.   Pulmonary:      Breath sounds: Normal breath sounds.   Musculoskeletal:      Cervical back: Normal range of motion.   Neurological:      Mental Status: He is alert.        Result Review :   The following data was reviewed by: GEOVANI Miramontes on 11/23/2021:  Common labs    Common Labsle 4/12/21 4/12/21 4/12/21 4/12/21 7/13/21 10/12/21    1543 1554 1554 1614     Glucose  190 (A)       BUN  16       Creatinine  0.78       eGFR Non African Am  103       Sodium  136       Potassium  4.0       Chloride  103       Calcium  9.6       Albumin  4.20       Total Bilirubin  0.4       Alkaline Phosphatase  80       AST (SGOT)  15       ALT (SGPT)  28       WBC 9.09        Hemoglobin 14.7      "   Hematocrit 43.1        Platelets 254        Total Cholesterol   299 (A)      Triglycerides   217 (A)      HDL Cholesterol   49      LDL Cholesterol    208 (A)      Hemoglobin A1C    9.40 (A) 9.9 9.4   (A) Abnormal value                      Assessment and Plan    Diagnoses and all orders for this visit:    1. Type 2 diabetes mellitus with hyperglycemia, with long-term current use of insulin (HCC) (Primary)    2. Primary hypertension    3. Mixed hyperlipidemia    Other orders  -     Insulin Glargine, 1 Unit Dial, (Toujeo SoloStar) 300 UNIT/ML solution pen-injector injection; Inject 50 Units under the skin into the appropriate area as directed every night at bedtime.  Dispense: 5 pen; Refill: 11  -     Insulin Lispro, 1 Unit Dial, (HumaLOG KwikPen) 100 UNIT/ML solution pen-injector; 4 to 20 units with meals TID  Dispense: 6 pen; Refill: 11  -     Insulin Pen Needle (Pen Needles) 32G X 4 MM misc; 1 each 4 (Four) Times a Day. Use 4 x daily, Dx code E11.65  Dispense: 120 each; Refill: 11                   Glycemic Management:      Diabetes mellitus type 2    Lab Results   Component Value Date    HGBA1C 9.4 10/12/2021               States he is allergic to Trulicity    Had trouble with jardiance       Taking Janumet 100/1000 mg daily --keep         Start Toujeo once daily      Start with 20 units once daily       If am sugar is less than 80 decrease by 5 units       Start Humalog     2 units per 15 grams of Carbohydrate that you eat    Try to aim for 60 grams of carbohydrate per meal        give 8 untis for a 60 gram meal     Plus     Sliding scale    151-200      give 2 units   201-250      Give 4 units   251-300      Give 6 units   Above 301  Give 8 units       Goals for sugar    Fasting and before meals 80 to 130    2 hours after meals 180 or less      Aim for 60 grams of carbohydrate per meal    Aim for 15 grams of carbohydrate per snack         Microvascular Complications Monitoring       Last eye exam---- March  2021, no DR     Neuropathy --mild       Lipid Management:       Taking lipitor 20 mg one at night       Total Cholesterol   Date Value Ref Range Status   04/12/2021 299 (H) 0 - 200 mg/dL Final     Triglycerides   Date Value Ref Range Status   04/12/2021 217 (H) 0 - 150 mg/dL Final     HDL Cholesterol   Date Value Ref Range Status   04/12/2021 49 40 - 60 mg/dL Final     LDL Cholesterol    Date Value Ref Range Status   04/12/2021 208 (H) 0 - 100 mg/dL Final                  Blood Pressure Management:      Taking lisinopril 25 mg daily       Thyroid Health    No results found for: TSH        Bone Health       Lab Results   Component Value Date    CALCIUM 9.6 04/12/2021         Weight Management:      Patient's Body mass index is 36.75 kg/m². indicating that he is obese (BMI >30). Obesity-related health conditions include the following: diabetes mellitus. Obesity is unchanged. BMI is is above average; no BMI management plan is appropriate. We discussed portion control and increasing exercise..        Preventive Care:     Non smoker         Records from Dr. Orozco received and reviewed from 2021  Thank you for this consultation       Follow Up   Return in about 4 weeks (around 12/21/2021) for Recheck.  Patient was given instructions and counseling regarding his condition or for health maintenance advice. Please see specific information pulled into the AVS if appropriate.         This document has been electronically signed by GEOVANI Miramontes on November 23, 2021 13:57 CST.

## 2021-11-23 NOTE — PATIENT INSTRUCTIONS
Start Toujeo once daily      Start with 20 units once daily       If am sugar is less than 80 decrease by 5 units       Start Humalog     2 units per 15 grams of Carbohydrate that you eat    Try to aim for 60 grams of carbohydrate per meal        give 8 untis for a 60 gram meal     Plus     Sliding scale    151-200      give 2 units   201-250      Give 4 units   251-300      Give 6 units   Above 301  Give 8 units       Goals for sugar    Fasting and before meals 80 to 130    2 hours after meals 180 or less      Aim for 60 grams of carbohydrate per meal    Aim for 15 grams of carbohydrate per snack

## 2021-12-17 ENCOUNTER — OFFICE VISIT (OUTPATIENT)
Dept: ENDOCRINOLOGY | Facility: CLINIC | Age: 56
End: 2021-12-17

## 2021-12-17 VITALS
HEART RATE: 66 BPM | BODY MASS INDEX: 39.99 KG/M2 | SYSTOLIC BLOOD PRESSURE: 140 MMHG | OXYGEN SATURATION: 98 % | WEIGHT: 270 LBS | DIASTOLIC BLOOD PRESSURE: 80 MMHG | HEIGHT: 69 IN

## 2021-12-17 DIAGNOSIS — E55.9 VITAMIN D DEFICIENCY: ICD-10-CM

## 2021-12-17 DIAGNOSIS — E11.65 TYPE 2 DIABETES MELLITUS WITH HYPERGLYCEMIA, WITH LONG-TERM CURRENT USE OF INSULIN (HCC): Primary | ICD-10-CM

## 2021-12-17 DIAGNOSIS — I10 PRIMARY HYPERTENSION: ICD-10-CM

## 2021-12-17 DIAGNOSIS — E78.2 MIXED HYPERLIPIDEMIA: ICD-10-CM

## 2021-12-17 DIAGNOSIS — Z79.4 TYPE 2 DIABETES MELLITUS WITH HYPERGLYCEMIA, WITH LONG-TERM CURRENT USE OF INSULIN (HCC): Primary | ICD-10-CM

## 2021-12-17 PROCEDURE — 99214 OFFICE O/P EST MOD 30 MIN: CPT | Performed by: NURSE PRACTITIONER

## 2021-12-17 RX ORDER — PROCHLORPERAZINE 25 MG/1
1 SUPPOSITORY RECTAL
Qty: 1 EACH | Refills: 3 | Status: SHIPPED | OUTPATIENT
Start: 2021-12-17 | End: 2023-01-10

## 2021-12-17 RX ORDER — PROCHLORPERAZINE 25 MG/1
1 SUPPOSITORY RECTAL ONCE
Qty: 1 EACH | Refills: 1 | Status: SHIPPED | OUTPATIENT
Start: 2021-12-17 | End: 2021-12-17

## 2021-12-17 RX ORDER — PROCHLORPERAZINE 25 MG/1
1 SUPPOSITORY RECTAL AS NEEDED
Qty: 9 EACH | Refills: 3 | Status: SHIPPED | OUTPATIENT
Start: 2021-12-17 | End: 2023-01-03

## 2021-12-17 NOTE — PROGRESS NOTES
"Chief Complaint  Diabetes (t2)    Subjective          Jose Isaac presents to [facility]  History of Present Illness     In office visit    56-year-old male presents for follow-up    Reason diabetes mellitus type 2     Timing constant     Diagnosed in his 30 s with random bg check and was 600    Quality not  Controlled     Severity high    Lab Results   Component Value Date    HGBA1C 9.4 10/12/2021           Macrovascular complications--CAD        MI, stent      Microvascular complications --neuropathy , no DR , no renal disease            Current diabetes regimen        Oral medications, insulin         Current glucose monitoring      Fingerstick        8 times daily     Am under 130     Daytime under 150                Review of Systems - General ROS: negative             Objective   Vital Signs:   /80   Pulse 66   Ht 175.3 cm (69\")   Wt 122 kg (270 lb)   SpO2 98%   BMI 39.87 kg/m²     Physical Exam  Constitutional:       Appearance: Normal appearance.   Cardiovascular:      Rate and Rhythm: Regular rhythm.      Heart sounds: Normal heart sounds.   Pulmonary:      Breath sounds: Normal breath sounds.   Musculoskeletal:      Cervical back: Normal range of motion.   Neurological:      General: No focal deficit present.      Mental Status: He is alert.   Psychiatric:         Mood and Affect: Mood normal.         Thought Content: Thought content normal.         Judgment: Judgment normal.        Result Review :   The following data was reviewed by: GEOVANI Miramontes on 12/17/2021:  Common labs    Common Labsle 4/12/21 4/12/21 4/12/21 4/12/21 7/13/21 10/12/21    1543 1554 1554 1614     Glucose  190 (A)       BUN  16       Creatinine  0.78       eGFR Non African Am  103       Sodium  136       Potassium  4.0       Chloride  103       Calcium  9.6       Albumin  4.20       Total Bilirubin  0.4       Alkaline Phosphatase  80       AST (SGOT)  15     "   ALT (SGPT)  28       WBC 9.09        Hemoglobin 14.7        Hematocrit 43.1        Platelets 254        Total Cholesterol   299 (A)      Triglycerides   217 (A)      HDL Cholesterol   49      LDL Cholesterol    208 (A)      Hemoglobin A1C    9.40 (A) 9.9 9.4   (A) Abnormal value                      Assessment and Plan    Diagnoses and all orders for this visit:    1. Type 2 diabetes mellitus with hyperglycemia, with long-term current use of insulin (HCC) (Primary)    2. Primary hypertension    3. Mixed hyperlipidemia    4. Vitamin D deficiency    Other orders  -     Continuous Blood Gluc Transmit (Dexcom G6 Transmitter) misc; 1 each Every 3 (Three) Months.  Dispense: 1 each; Refill: 3  -     Continuous Blood Gluc Sensor (Dexcom G6 Sensor); 1 each As Needed (glucose control). Every 10 days  Dispense: 9 each; Refill: 3  -     Continuous Blood Gluc  (Dexcom G6 ) device; 1 each 1 (One) Time for 1 dose.  Dispense: 1 each; Refill: 1           Glycemic Management:        Diabetes mellitus type 2           Lab Results   Component Value Date     HGBA1C 9.4 10/12/2021                     States he is allergic to Trulicity     Had trouble with jardiance         Taking Janumet 100/1000 mg daily --keep              Toujeo once daily---- 45 units                   If am sugar is less than 80 decrease by 5 units         Humalog      1 unit per  6 CHO before each meal      Try to aim for 60 grams of carbohydrate per meal             Plus      Sliding scale     151-200      give 2 units   201-250      Give 4 units   251-300      Give 6 units   Above 301  Give 8 units         Goals for sugar     Fasting and before meals 80 to 130     2 hours after meals 180 or less        Aim for 60 grams of carbohydrate per meal     Aim for 15 grams of carbohydrate per snack         Approve dexcom G6     The patient has diabetes mellitus, insulin-dependent.     Our Diabetes Department has evaluated the patient in the last six  months and will continue counseling on insulin adjustment.      The patient performs blood glucose testing at least four times daily with proven glucose variability from 50 to 300 mg per dl.     The patient is administering basal insulin and prandial insulin four times per day for more than six months.     The patient uses a home blood glucose monitor to assess blood glucose at least four times daily for more than six months.     The patient requires frequent adjustment of insulin treatment regimen based on blood glucose readings.     The patient has frequent variability in blood glucose readings due to activity and variability in meal content and time.      The patient has completed a diabetes education program with us.     The patient has demonstrated the ability to self-monitor her glucose.      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; he has difficulty getting blood from fingers needs cgm therapy to help control bg            Microvascular Complications Monitoring         Last eye exam---- March 2021, no DR      Neuropathy --mild               Lipid Management:         Taking lipitor 20 mg one at night               Total Cholesterol   Date Value Ref Range Status   04/12/2021 299 (H) 0 - 200 mg/dL Final            Triglycerides   Date Value Ref Range Status   04/12/2021 217 (H) 0 - 150 mg/dL Final            HDL Cholesterol   Date Value Ref Range Status   04/12/2021 49 40 - 60 mg/dL Final            LDL Cholesterol    Date Value Ref Range Status   04/12/2021 208 (H) 0 - 100 mg/dL Final                        Blood Pressure Management:        Taking lisinopril 25 mg daily         Thyroid Health       No results found for: TSH             Bone Health               Lab Results   Component Value Date     CALCIUM 9.6 04/12/2021            Weight  Management:       Obesity     Patient's Body mass index is 39.87 kg/m². indicating that he is obese (BMI >30). Obesity-related health conditions include the following: diabetes mellitus. Obesity is unchanged. BMI is is above average; no BMI management plan is appropriate. We discussed portion control and increasing exercise..            Preventive Care:      Non smoker           Follow Up   Return in about 3 months (around 3/17/2022) for Recheck.  Patient was given instructions and counseling regarding his condition or for health maintenance advice. Please see specific information pulled into the AVS if appropriate.         This document has been electronically signed by GEOVANI Miramontes on December 17, 2021 15:52 CST.

## 2021-12-21 ENCOUNTER — DOCUMENTATION (OUTPATIENT)
Dept: ENDOCRINOLOGY | Facility: CLINIC | Age: 56
End: 2021-12-21

## 2022-03-23 ENCOUNTER — OFFICE VISIT (OUTPATIENT)
Dept: ENDOCRINOLOGY | Facility: CLINIC | Age: 57
End: 2022-03-23

## 2022-03-23 ENCOUNTER — LAB (OUTPATIENT)
Dept: LAB | Facility: HOSPITAL | Age: 57
End: 2022-03-23

## 2022-03-23 VITALS
WEIGHT: 267.2 LBS | DIASTOLIC BLOOD PRESSURE: 80 MMHG | BODY MASS INDEX: 38.25 KG/M2 | HEART RATE: 90 BPM | HEIGHT: 70 IN | OXYGEN SATURATION: 100 % | SYSTOLIC BLOOD PRESSURE: 140 MMHG

## 2022-03-23 DIAGNOSIS — E78.2 MIXED HYPERLIPIDEMIA: ICD-10-CM

## 2022-03-23 DIAGNOSIS — I10 PRIMARY HYPERTENSION: ICD-10-CM

## 2022-03-23 DIAGNOSIS — E55.9 VITAMIN D DEFICIENCY: ICD-10-CM

## 2022-03-23 DIAGNOSIS — Z79.4 TYPE 2 DIABETES MELLITUS WITH HYPERGLYCEMIA, WITH LONG-TERM CURRENT USE OF INSULIN: Primary | ICD-10-CM

## 2022-03-23 DIAGNOSIS — E11.65 TYPE 2 DIABETES MELLITUS WITH HYPERGLYCEMIA, WITH LONG-TERM CURRENT USE OF INSULIN: Primary | ICD-10-CM

## 2022-03-23 LAB
ALBUMIN SERPL-MCNC: 4.1 G/DL (ref 3.5–5.2)
ALBUMIN/GLOB SERPL: 1.6 G/DL
ALP SERPL-CCNC: 70 U/L (ref 39–117)
ALT SERPL W P-5'-P-CCNC: 29 U/L (ref 1–41)
ANION GAP SERPL CALCULATED.3IONS-SCNC: 12 MMOL/L (ref 5–15)
AST SERPL-CCNC: 20 U/L (ref 1–40)
BASOPHILS # BLD AUTO: 0.09 10*3/MM3 (ref 0–0.2)
BASOPHILS NFR BLD AUTO: 1.2 % (ref 0–1.5)
BILIRUB SERPL-MCNC: 0.2 MG/DL (ref 0–1.2)
BUN SERPL-MCNC: 17 MG/DL (ref 6–20)
BUN/CREAT SERPL: 20.5 (ref 7–25)
CALCIUM SPEC-SCNC: 8.8 MG/DL (ref 8.6–10.5)
CHLORIDE SERPL-SCNC: 104 MMOL/L (ref 98–107)
CO2 SERPL-SCNC: 22 MMOL/L (ref 22–29)
CREAT SERPL-MCNC: 0.83 MG/DL (ref 0.76–1.27)
DEPRECATED RDW RBC AUTO: 43.1 FL (ref 37–54)
EGFRCR SERPLBLD CKD-EPI 2021: 102.7 ML/MIN/1.73
EOSINOPHIL # BLD AUTO: 0.2 10*3/MM3 (ref 0–0.4)
EOSINOPHIL NFR BLD AUTO: 2.7 % (ref 0.3–6.2)
ERYTHROCYTE [DISTWIDTH] IN BLOOD BY AUTOMATED COUNT: 13.6 % (ref 12.3–15.4)
GLOBULIN UR ELPH-MCNC: 2.5 GM/DL
GLUCOSE SERPL-MCNC: 89 MG/DL (ref 65–99)
HCT VFR BLD AUTO: 42.4 % (ref 37.5–51)
HGB BLD-MCNC: 14.3 G/DL (ref 13–17.7)
IMM GRANULOCYTES # BLD AUTO: 0.06 10*3/MM3 (ref 0–0.05)
IMM GRANULOCYTES NFR BLD AUTO: 0.8 % (ref 0–0.5)
LYMPHOCYTES # BLD AUTO: 1.81 10*3/MM3 (ref 0.7–3.1)
LYMPHOCYTES NFR BLD AUTO: 24.6 % (ref 19.6–45.3)
MCH RBC QN AUTO: 29.3 PG (ref 26.6–33)
MCHC RBC AUTO-ENTMCNC: 33.7 G/DL (ref 31.5–35.7)
MCV RBC AUTO: 86.9 FL (ref 79–97)
MONOCYTES # BLD AUTO: 0.72 10*3/MM3 (ref 0.1–0.9)
MONOCYTES NFR BLD AUTO: 9.8 % (ref 5–12)
NEUTROPHILS NFR BLD AUTO: 4.47 10*3/MM3 (ref 1.7–7)
NEUTROPHILS NFR BLD AUTO: 60.9 % (ref 42.7–76)
NRBC BLD AUTO-RTO: 0 /100 WBC (ref 0–0.2)
PLATELET # BLD AUTO: 277 10*3/MM3 (ref 140–450)
PMV BLD AUTO: 10.1 FL (ref 6–12)
POTASSIUM SERPL-SCNC: 4.3 MMOL/L (ref 3.5–5.2)
PROT SERPL-MCNC: 6.6 G/DL (ref 6–8.5)
RBC # BLD AUTO: 4.88 10*6/MM3 (ref 4.14–5.8)
SODIUM SERPL-SCNC: 138 MMOL/L (ref 136–145)
WBC NRBC COR # BLD: 7.35 10*3/MM3 (ref 3.4–10.8)

## 2022-03-23 PROCEDURE — 95251 CONT GLUC MNTR ANALYSIS I&R: CPT | Performed by: NURSE PRACTITIONER

## 2022-03-23 PROCEDURE — 82306 VITAMIN D 25 HYDROXY: CPT | Performed by: NURSE PRACTITIONER

## 2022-03-23 PROCEDURE — 80050 GENERAL HEALTH PANEL: CPT | Performed by: NURSE PRACTITIONER

## 2022-03-23 PROCEDURE — 82043 UR ALBUMIN QUANTITATIVE: CPT | Performed by: NURSE PRACTITIONER

## 2022-03-23 PROCEDURE — 36415 COLL VENOUS BLD VENIPUNCTURE: CPT | Performed by: NURSE PRACTITIONER

## 2022-03-23 PROCEDURE — 82570 ASSAY OF URINE CREATININE: CPT | Performed by: NURSE PRACTITIONER

## 2022-03-23 PROCEDURE — 83036 HEMOGLOBIN GLYCOSYLATED A1C: CPT | Performed by: NURSE PRACTITIONER

## 2022-03-23 PROCEDURE — 99214 OFFICE O/P EST MOD 30 MIN: CPT | Performed by: NURSE PRACTITIONER

## 2022-03-23 NOTE — PROGRESS NOTES
"Chief Complaint  Diabetes    Subjective          Jose Isaac presents to [facility] ENDOCRIN[facility]  History of Present Illness    In office visit     56-year-old male presents for follow-up     Reason diabetes mellitus type 2     Diagnosed in his 30s      Timing constant       Quality controlled      Severity high               Macrovascular complications--CAD        MI, stent      Microvascular complications --neuropathy , no DR , no renal disease            Current diabetes regimen         Oral medications, insulin         Current glucose monitoring      Fingerstick         8 times daily       Dexcom see below         Review of Systems - General ROS: negative        Vital Signs:   /80   Pulse 90   Ht 177.8 cm (70\")   Wt 121 kg (267 lb 3.2 oz)   SpO2 100%   BMI 38.34 kg/m²     Physical Exam  Constitutional:       Appearance: Normal appearance.   Cardiovascular:      Rate and Rhythm: Regular rhythm.      Heart sounds: Normal heart sounds.   Pulmonary:      Breath sounds: Normal breath sounds.   Musculoskeletal:      Cervical back: Normal range of motion.   Neurological:      Mental Status: He is alert.        Result Review :   The following data was reviewed by: GEOVANI Miramontes on 03/23/2022:  Common labs    Common Labsle 4/12/21 4/12/21 4/12/21 4/12/21 7/13/21 10/12/21    1543 1554 1554 1614     Glucose  190 (A)       BUN  16       Creatinine  0.78       eGFR Non African Am  103       Sodium  136       Potassium  4.0       Chloride  103       Calcium  9.6       Albumin  4.20       Total Bilirubin  0.4       Alkaline Phosphatase  80       AST (SGOT)  15       ALT (SGPT)  28       WBC 9.09        Hemoglobin 14.7        Hematocrit 43.1        Platelets 254        Total Cholesterol   299 (A)      Triglycerides   217 (A)      HDL Cholesterol   49      LDL Cholesterol    208 (A)      Hemoglobin A1C    9.40 (A) 9.9 9.4   (A) Abnormal value                    "   Assessment and Plan    Diagnoses and all orders for this visit:    1. Type 2 diabetes mellitus with hyperglycemia, with long-term current use of insulin (HCC) (Primary)  -     CBC & Differential  -     Comprehensive Metabolic Panel  -     Hemoglobin A1c  -     TSH  -     Microalbumin / Creatinine Urine Ratio - Urine, Clean Catch  -     Vitamin D 25 Hydroxy    2. Primary hypertension  -     CBC & Differential  -     Comprehensive Metabolic Panel  -     Hemoglobin A1c  -     TSH  -     Microalbumin / Creatinine Urine Ratio - Urine, Clean Catch  -     Vitamin D 25 Hydroxy    3. Vitamin D deficiency  -     CBC & Differential  -     Comprehensive Metabolic Panel  -     Hemoglobin A1c  -     TSH  -     Microalbumin / Creatinine Urine Ratio - Urine, Clean Catch  -     Vitamin D 25 Hydroxy    4. Mixed hyperlipidemia  -     CBC & Differential  -     Comprehensive Metabolic Panel  -     Hemoglobin A1c  -     TSH  -     Microalbumin / Creatinine Urine Ratio - Urine, Clean Catch  -     Vitamin D 25 Hydroxy             Glycemic Management:        Diabetes mellitus type 2               Lab Results   Component Value Date     HGBA1C 9.4 10/12/2021          dexcom g6    Downloaded and reviewed     Dated from March 10 to March 23, 2022      Average bg 150     Time in target 84%     High 15%     Very high less than 1 %     Low 0 %     Very low 0 %       He is in target 84% of the time     No changes to regimen     He has occasional postprandial hyperglycemia with supper         States he is allergic to Trulicity     Had trouble with jardiance         Taking Janumet 100/1000 mg daily --keep               Toujeo once daily---- 50 units                     If am sugar is less than 80 decrease by 5 units         Humalog      1 unit per  6 CHO before each meal      Try to aim for 60 grams of carbohydrate per meal               Plus      Sliding scale     151-200      give 2 units   201-250      Give 4 units   251-300      Give 6 units    Above 301  Give 8 units         Goals for sugar     Fasting and before meals 80 to 130     2 hours after meals 180 or less        Aim for 60 grams of carbohydrate per meal     Aim for 15 grams of carbohydrate per snack                      Patient has to check 8 times daily , 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; he has difficulty getting blood from fingers needs cgm therapy to help control bg            Microvascular Complications Monitoring         Last eye exam---- March 2021, no DR      Neuropathy --mild                  Lipid Management:         Taking lipitor 20 mg one at night         Total Cholesterol   Date Value Ref Range Status   04/12/2021 299 (H) 0 - 200 mg/dL Final     Triglycerides   Date Value Ref Range Status   04/12/2021 217 (H) 0 - 150 mg/dL Final     HDL Cholesterol   Date Value Ref Range Status   04/12/2021 49 40 - 60 mg/dL Final     LDL Cholesterol    Date Value Ref Range Status   04/12/2021 208 (H) 0 - 100 mg/dL Final                    Blood Pressure Management:        Taking lisinopril 25 mg daily         Thyroid Health        No results found for: TSH              Bone Health                   Lab Results   Component Value Date     CALCIUM 9.6 04/12/2021            Weight Management:        Obesity        Patient's Body mass index is 38.34 kg/m². indicating that he is obese (BMI >30). Obesity-related health conditions include the following: diabetes mellitus. Obesity is unchanged. BMI is is above average; no BMI management plan is appropriate. We discussed portion control and increasing exercise..                Preventive Care:      Non smoker                     Follow Up   Return in about 3 months (around 6/23/2022) for Recheck.  Patient was given instructions and counseling regarding his condition or for health maintenance advice. Please see specific information pulled into the AVS if appropriate.         This document has been electronically signed by  Adam Kwon, APRN on March 23, 2022 16:21 CDT.

## 2022-03-24 LAB
25(OH)D3 SERPL-MCNC: 18.7 NG/ML (ref 30–100)
ALBUMIN UR-MCNC: <1.2 MG/DL
CREAT UR-MCNC: 84.5 MG/DL
HBA1C MFR BLD: 6.8 % (ref 4.8–5.6)
MICROALBUMIN/CREAT UR: NORMAL MG/G{CREAT}
TSH SERPL DL<=0.05 MIU/L-ACNC: 1.29 UIU/ML (ref 0.27–4.2)

## 2022-06-23 ENCOUNTER — OFFICE VISIT (OUTPATIENT)
Dept: ENDOCRINOLOGY | Facility: CLINIC | Age: 57
End: 2022-06-23

## 2022-06-23 VITALS
DIASTOLIC BLOOD PRESSURE: 84 MMHG | HEART RATE: 90 BPM | SYSTOLIC BLOOD PRESSURE: 128 MMHG | WEIGHT: 273.2 LBS | OXYGEN SATURATION: 98 % | HEIGHT: 69 IN | BODY MASS INDEX: 40.46 KG/M2

## 2022-06-23 DIAGNOSIS — Z79.4 TYPE 2 DIABETES MELLITUS WITH HYPERGLYCEMIA, WITH LONG-TERM CURRENT USE OF INSULIN: ICD-10-CM

## 2022-06-23 DIAGNOSIS — E55.9 VITAMIN D DEFICIENCY: ICD-10-CM

## 2022-06-23 DIAGNOSIS — E78.2 MIXED HYPERLIPIDEMIA: Primary | ICD-10-CM

## 2022-06-23 DIAGNOSIS — I10 PRIMARY HYPERTENSION: ICD-10-CM

## 2022-06-23 DIAGNOSIS — E11.65 TYPE 2 DIABETES MELLITUS WITH HYPERGLYCEMIA, WITH LONG-TERM CURRENT USE OF INSULIN: ICD-10-CM

## 2022-06-23 PROCEDURE — 99214 OFFICE O/P EST MOD 30 MIN: CPT | Performed by: NURSE PRACTITIONER

## 2022-06-23 PROCEDURE — 95251 CONT GLUC MNTR ANALYSIS I&R: CPT | Performed by: NURSE PRACTITIONER

## 2022-06-23 NOTE — PROGRESS NOTES
"Chief Complaint  Diabetes    Subjective          Jose Isaac presents to [facility] ENDOCRINOLOG[facility]  History of Present Illness      In office visit     56-year-old male presents for follow-up     Reason diabetes mellitus type 2     Diagnosed in his 30s      Timing constant       Quality controlled      Severity high       Macrovascular complications--CAD        MI, stent      Microvascular complications --neuropathy , no DR , no renal disease            Current diabetes regimen         Oral medications, insulin         Current glucose monitoring      Fingerstick         8 times daily       Dexcom see below         Review of Systems - General ROS: negative        Vital Signs:   /84   Pulse 90   Ht 175.3 cm (69\")   Wt 124 kg (273 lb 3.2 oz)   SpO2 98%   BMI 40.34 kg/m²     Physical Exam  Constitutional:       Appearance: Normal appearance.   Cardiovascular:      Rate and Rhythm: Regular rhythm.      Heart sounds: Normal heart sounds.   Pulmonary:      Breath sounds: Normal breath sounds.   Musculoskeletal:      Cervical back: Normal range of motion.   Neurological:      Mental Status: He is alert.        Result Review :   The following data was reviewed by: GEOVANI Miramontes on 03/23/2022:  Common labs    Common Labsle 7/13/21 10/12/21 3/23/22 3/23/22 3/23/22 3/23/22      1627 1627 1627 1639   Glucose    89     BUN    17     Creatinine    0.83     Sodium    138     Potassium    4.3     Chloride    104     Calcium    8.8     Albumin    4.10     Total Bilirubin    0.2     Alkaline Phosphatase    70     AST (SGOT)    20     ALT (SGPT)    29     WBC   7.35      Hemoglobin   14.3      Hematocrit   42.4      Platelets   277      Hemoglobin A1C 9.9 9.4   6.80 (A)    Microalbumin, Urine      <1.2   (A) Abnormal value                        Assessment and Plan    Diagnoses and all orders for this visit:    1. Mixed hyperlipidemia (Primary)  -     CBC & Differential; " Future  -     Comprehensive Metabolic Panel; Future  -     Hemoglobin A1c; Future  -     Lipid Panel; Future  -     Microalbumin / Creatinine Urine Ratio - Urine, Clean Catch; Future  -     TSH; Future  -     Vitamin D 25 Hydroxy; Future    2. Type 2 diabetes mellitus with hyperglycemia, with long-term current use of insulin (HCC)  -     CBC & Differential; Future  -     Comprehensive Metabolic Panel; Future  -     Hemoglobin A1c; Future  -     Lipid Panel; Future  -     Microalbumin / Creatinine Urine Ratio - Urine, Clean Catch; Future  -     TSH; Future  -     Vitamin D 25 Hydroxy; Future    3. Primary hypertension  -     CBC & Differential; Future  -     Comprehensive Metabolic Panel; Future  -     Hemoglobin A1c; Future  -     Lipid Panel; Future  -     Microalbumin / Creatinine Urine Ratio - Urine, Clean Catch; Future  -     TSH; Future  -     Vitamin D 25 Hydroxy; Future    4. Vitamin D deficiency  -     CBC & Differential; Future  -     Comprehensive Metabolic Panel; Future  -     Hemoglobin A1c; Future  -     Lipid Panel; Future  -     Microalbumin / Creatinine Urine Ratio - Urine, Clean Catch; Future  -     TSH; Future  -     Vitamin D 25 Hydroxy; Future             Glycemic Management:        Diabetes mellitus type 2    Lab Results   Component Value Date    HGBA1C 6.80 (H) 03/23/2022           dexcom g6    Downloaded and reviewed     Dated from Noa 10 to June 23, 2022    Average bg 154    Time in target 79%    High 19%     Very high 1%     Low less 1%     Very low 0%         States he is allergic to Trulicity     Had trouble with jardiance         Taking Janumet 100/1000 mg daily --keep               Toujeo once daily---- 50 units                     If am sugar is less than 80 decrease by 5 units         Humalog      1 unit per  6 CHO before each meal      Try to aim for 60 grams of carbohydrate per meal               Plus      Sliding scale     151-200      give 2 units   201-250      Give 4 units    251-300      Give 6 units   Above 301  Give 8 units         Goals for sugar     Fasting and before meals 80 to 130     2 hours after meals 180 or less        Aim for 60 grams of carbohydrate per meal     Aim for 15 grams of carbohydrate per snack                      Patient has to check 8 times daily , 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; he has difficulty getting blood from fingers needs cgm therapy to help control bg            Microvascular Complications Monitoring         Last eye exam---- May 2022 , no DR      Neuropathy --mild   Not on RX                     Lipid Management:         Taking lipitor 20 mg one at night         Total Cholesterol   Date Value Ref Range Status   04/12/2021 299 (H) 0 - 200 mg/dL Final     Triglycerides   Date Value Ref Range Status   04/12/2021 217 (H) 0 - 150 mg/dL Final     HDL Cholesterol   Date Value Ref Range Status   04/12/2021 49 40 - 60 mg/dL Final     LDL Cholesterol    Date Value Ref Range Status   04/12/2021 208 (H) 0 - 100 mg/dL Final                    Blood Pressure Management:        Taking lisinopril 25 mg daily         Thyroid Health        No results found for: TSH              Bone Health                   Lab Results   Component Value Date     CALCIUM 9.6 04/12/2021            Weight Management:        Obesity        Patient's Body mass index is 40.34 kg/m². indicating that he is obese (BMI >30). Obesity-related health conditions include the following: diabetes mellitus. Obesity is unchanged. BMI is is above average; no BMI management plan is appropriate. We discussed portion control and increasing exercise..                Preventive Care:      Non smoker                     Follow Up   Return in about 4 months (around 10/23/2022) for Recheck.  Patient was given instructions and counseling regarding his condition or for health maintenance advice. Please see specific information pulled into the AVS if appropriate.         This  document has been electronically signed by GEOVANI Miramontes on June 23, 2022 16:04 CDT.

## 2022-10-21 RX ORDER — INSULIN GLARGINE 300 U/ML
INJECTION, SOLUTION SUBCUTANEOUS
Qty: 9 ML | Refills: 7 | Status: SHIPPED | OUTPATIENT
Start: 2022-10-21 | End: 2023-02-23 | Stop reason: SDUPTHER

## 2022-10-24 ENCOUNTER — LAB (OUTPATIENT)
Dept: LAB | Facility: HOSPITAL | Age: 57
End: 2022-10-24

## 2022-10-24 ENCOUNTER — OFFICE VISIT (OUTPATIENT)
Dept: ENDOCRINOLOGY | Facility: CLINIC | Age: 57
End: 2022-10-24

## 2022-10-24 VITALS
WEIGHT: 283.4 LBS | OXYGEN SATURATION: 95 % | DIASTOLIC BLOOD PRESSURE: 62 MMHG | BODY MASS INDEX: 40.57 KG/M2 | HEIGHT: 70 IN | HEART RATE: 52 BPM | SYSTOLIC BLOOD PRESSURE: 120 MMHG

## 2022-10-24 DIAGNOSIS — E78.2 MIXED HYPERLIPIDEMIA: ICD-10-CM

## 2022-10-24 DIAGNOSIS — I10 PRIMARY HYPERTENSION: ICD-10-CM

## 2022-10-24 DIAGNOSIS — Z79.4 TYPE 2 DIABETES MELLITUS WITH HYPERGLYCEMIA, WITH LONG-TERM CURRENT USE OF INSULIN: Primary | ICD-10-CM

## 2022-10-24 DIAGNOSIS — E11.65 TYPE 2 DIABETES MELLITUS WITH HYPERGLYCEMIA, WITH LONG-TERM CURRENT USE OF INSULIN: Primary | ICD-10-CM

## 2022-10-24 DIAGNOSIS — E55.9 VITAMIN D DEFICIENCY: ICD-10-CM

## 2022-10-24 LAB
ALBUMIN SERPL-MCNC: 4.3 G/DL (ref 3.5–5.2)
ALBUMIN/GLOB SERPL: 1.5 G/DL
ALP SERPL-CCNC: 78 U/L (ref 39–117)
ALT SERPL W P-5'-P-CCNC: 24 U/L (ref 1–41)
ANION GAP SERPL CALCULATED.3IONS-SCNC: 11 MMOL/L (ref 5–15)
AST SERPL-CCNC: 26 U/L (ref 1–40)
BASOPHILS # BLD AUTO: 0.1 10*3/MM3 (ref 0–0.2)
BASOPHILS NFR BLD AUTO: 1 % (ref 0–1.5)
BILIRUB SERPL-MCNC: 0.4 MG/DL (ref 0–1.2)
BUN SERPL-MCNC: 16 MG/DL (ref 6–20)
BUN/CREAT SERPL: 17 (ref 7–25)
CALCIUM SPEC-SCNC: 9.4 MG/DL (ref 8.6–10.5)
CHLORIDE SERPL-SCNC: 103 MMOL/L (ref 98–107)
CHOLEST SERPL-MCNC: 314 MG/DL (ref 0–200)
CO2 SERPL-SCNC: 24 MMOL/L (ref 22–29)
CREAT SERPL-MCNC: 0.94 MG/DL (ref 0.76–1.27)
DEPRECATED RDW RBC AUTO: 42.1 FL (ref 37–54)
EGFRCR SERPLBLD CKD-EPI 2021: 94.6 ML/MIN/1.73
EOSINOPHIL # BLD AUTO: 0.14 10*3/MM3 (ref 0–0.4)
EOSINOPHIL NFR BLD AUTO: 1.4 % (ref 0.3–6.2)
ERYTHROCYTE [DISTWIDTH] IN BLOOD BY AUTOMATED COUNT: 13 % (ref 12.3–15.4)
GLOBULIN UR ELPH-MCNC: 2.9 GM/DL
GLUCOSE SERPL-MCNC: 99 MG/DL (ref 65–99)
HCT VFR BLD AUTO: 43.5 % (ref 37.5–51)
HDLC SERPL-MCNC: 52 MG/DL (ref 40–60)
HGB BLD-MCNC: 14.3 G/DL (ref 13–17.7)
IMM GRANULOCYTES # BLD AUTO: 0.09 10*3/MM3 (ref 0–0.05)
IMM GRANULOCYTES NFR BLD AUTO: 0.9 % (ref 0–0.5)
LDLC SERPL CALC-MCNC: 231 MG/DL (ref 0–100)
LDLC/HDLC SERPL: 4.42 {RATIO}
LYMPHOCYTES # BLD AUTO: 2 10*3/MM3 (ref 0.7–3.1)
LYMPHOCYTES NFR BLD AUTO: 20.5 % (ref 19.6–45.3)
MCH RBC QN AUTO: 28.9 PG (ref 26.6–33)
MCHC RBC AUTO-ENTMCNC: 32.9 G/DL (ref 31.5–35.7)
MCV RBC AUTO: 87.9 FL (ref 79–97)
MONOCYTES # BLD AUTO: 0.86 10*3/MM3 (ref 0.1–0.9)
MONOCYTES NFR BLD AUTO: 8.8 % (ref 5–12)
NEUTROPHILS NFR BLD AUTO: 6.58 10*3/MM3 (ref 1.7–7)
NEUTROPHILS NFR BLD AUTO: 67.4 % (ref 42.7–76)
NRBC BLD AUTO-RTO: 0 /100 WBC (ref 0–0.2)
PLATELET # BLD AUTO: 291 10*3/MM3 (ref 140–450)
PMV BLD AUTO: 10.5 FL (ref 6–12)
POTASSIUM SERPL-SCNC: 4.1 MMOL/L (ref 3.5–5.2)
PROT SERPL-MCNC: 7.2 G/DL (ref 6–8.5)
RBC # BLD AUTO: 4.95 10*6/MM3 (ref 4.14–5.8)
SODIUM SERPL-SCNC: 138 MMOL/L (ref 136–145)
TRIGL SERPL-MCNC: 162 MG/DL (ref 0–150)
TSH SERPL DL<=0.05 MIU/L-ACNC: 1.94 UIU/ML (ref 0.27–4.2)
VLDLC SERPL-MCNC: 31 MG/DL (ref 5–40)
WBC NRBC COR # BLD: 9.77 10*3/MM3 (ref 3.4–10.8)

## 2022-10-24 PROCEDURE — 95251 CONT GLUC MNTR ANALYSIS I&R: CPT | Performed by: NURSE PRACTITIONER

## 2022-10-24 PROCEDURE — 99214 OFFICE O/P EST MOD 30 MIN: CPT | Performed by: NURSE PRACTITIONER

## 2022-10-24 PROCEDURE — 80061 LIPID PANEL: CPT | Performed by: NURSE PRACTITIONER

## 2022-10-24 PROCEDURE — 82570 ASSAY OF URINE CREATININE: CPT | Performed by: NURSE PRACTITIONER

## 2022-10-24 PROCEDURE — 83036 HEMOGLOBIN GLYCOSYLATED A1C: CPT | Performed by: NURSE PRACTITIONER

## 2022-10-24 PROCEDURE — 82043 UR ALBUMIN QUANTITATIVE: CPT | Performed by: NURSE PRACTITIONER

## 2022-10-24 PROCEDURE — 80050 GENERAL HEALTH PANEL: CPT | Performed by: NURSE PRACTITIONER

## 2022-10-24 PROCEDURE — 36415 COLL VENOUS BLD VENIPUNCTURE: CPT | Performed by: NURSE PRACTITIONER

## 2022-10-24 PROCEDURE — 82306 VITAMIN D 25 HYDROXY: CPT | Performed by: NURSE PRACTITIONER

## 2022-10-24 NOTE — PROGRESS NOTES
"Chief Complaint  Diabetes    Subjective          Jose Isaac presents to [facility] ENDOCRINOLOGY  History of Present Illness      In office visit     56-year-old male presents for follow-up     Reason diabetes mellitus type 2     Diagnosed in his 30s      Timing constant       Quality controlled      Severity high       Macrovascular complications--CAD        MI, stent      Microvascular complications --neuropathy , no DR , no renal disease            Current diabetes regimen         Oral medications, insulin         Current glucose monitoring      Fingerstick         8 times daily       Dexcom see below         Review of Systems - General ROS: negative        Vital Signs:   /62   Pulse 52   Ht 177.8 cm (70\")   Wt 129 kg (283 lb 6.4 oz)   SpO2 95%   BMI 40.66 kg/m²     Physical Exam  Constitutional:       Appearance: Normal appearance.   Cardiovascular:      Rate and Rhythm: Regular rhythm.      Heart sounds: Normal heart sounds.   Pulmonary:      Breath sounds: Normal breath sounds.   Musculoskeletal:      Cervical back: Normal range of motion.   Neurological:      Mental Status: He is alert.        Result Review :   The following data was reviewed by: GEOVANI Miramontes on 03/23/2022:  Common labs    Common Labs 3/23/22 3/23/22 3/23/22 3/23/22    1627 1627 1627 1639   Glucose  89     BUN  17     Creatinine  0.83     Sodium  138     Potassium  4.3     Chloride  104     Calcium  8.8     Albumin  4.10     Total Bilirubin  0.2     Alkaline Phosphatase  70     AST (SGOT)  20     ALT (SGPT)  29     WBC 7.35      Hemoglobin 14.3      Hematocrit 42.4      Platelets 277      Hemoglobin A1C   6.80 (A)    Microalbumin, Urine    <1.2   (A) Abnormal value                        Assessment and Plan    Diagnoses and all orders for this visit:    1. Type 2 diabetes mellitus with hyperglycemia, with long-term current use of insulin (HCC) (Primary)  -     CBC & " Differential  -     Comprehensive Metabolic Panel  -     Hemoglobin A1c  -     Lipid Panel  -     Microalbumin / Creatinine Urine Ratio - Urine, Clean Catch  -     TSH  -     Vitamin D,25-Hydroxy    2. Primary hypertension  -     CBC & Differential  -     Comprehensive Metabolic Panel  -     Hemoglobin A1c  -     Lipid Panel  -     Microalbumin / Creatinine Urine Ratio - Urine, Clean Catch  -     TSH  -     Vitamin D,25-Hydroxy    3. Mixed hyperlipidemia  -     CBC & Differential  -     Comprehensive Metabolic Panel  -     Hemoglobin A1c  -     Lipid Panel  -     Microalbumin / Creatinine Urine Ratio - Urine, Clean Catch  -     TSH  -     Vitamin D,25-Hydroxy    4. Vitamin D deficiency  -     CBC & Differential  -     Comprehensive Metabolic Panel  -     Hemoglobin A1c  -     Lipid Panel  -     Microalbumin / Creatinine Urine Ratio - Urine, Clean Catch  -     TSH  -     Vitamin D,25-Hydroxy             Glycemic Management:        Diabetes mellitus type 2    Lab Results   Component Value Date    HGBA1C 6.80 (H) 03/23/2022           dexcom g6    Downloaded and reviewed     Dated from Oct. 11 to Oct. 24, 2022    Average bg 163       Time in target 73%     High 24%     Very high 2%     Low less than 1%     Very low 0%        he is mostly at goal       occassional postprandial hyperglycemia      States he is allergic to Trulicity     Had trouble with jardiance         Taking Janumet 100/1000 mg daily --keep               Toujeo once daily---- 50 units                     If am sugar is less than 80 decrease by 5 units         Humalog      1 unit per  6 CHO before each meal      Try to aim for 60 grams of carbohydrate per meal               Plus      Sliding scale     151-200      give 2 units   201-250      Give 4 units   251-300      Give 6 units   Above 301  Give 8 units         Goals for sugar     Fasting and before meals 80 to 130     2 hours after meals 180 or less        Aim for 60 grams of carbohydrate per  meal     Aim for 15 grams of carbohydrate per snack                      Patient has to check 8 times daily , 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; he has difficulty getting blood from fingers needs cgm therapy to help control bg            Microvascular Complications Monitoring         Last eye exam---- May 2022 , no DR      Neuropathy --mild   Not on RX                 Lipid Management:         Taking lipitor 20 mg one at night         Total Cholesterol   Date Value Ref Range Status   04/12/2021 299 (H) 0 - 200 mg/dL Final     Triglycerides   Date Value Ref Range Status   04/12/2021 217 (H) 0 - 150 mg/dL Final     HDL Cholesterol   Date Value Ref Range Status   04/12/2021 49 40 - 60 mg/dL Final     LDL Cholesterol    Date Value Ref Range Status   04/12/2021 208 (H) 0 - 100 mg/dL Final                    Blood Pressure Management:        Taking lisinopril 25 mg daily         Thyroid Health        No results found for: TSH              Bone Health                   Lab Results   Component Value Date     CALCIUM 9.6 04/12/2021            Weight Management:        Obesity        Patient's Body mass index is 40.66 kg/m². indicating that he is obese (BMI >30). Obesity-related health conditions include the following: diabetes mellitus. Obesity is unchanged. BMI is is above average; no BMI management plan is appropriate. We discussed portion control and increasing exercise..                Preventive Care:      Non smoker                     Follow Up   Return in about 4 months (around 2/24/2023) for Recheck.  Patient was given instructions and counseling regarding his condition or for health maintenance advice. Please see specific information pulled into the AVS if appropriate.         This document has been electronically signed by GEOVANI Miramontes on October 24, 2022 16:27 CDT.

## 2022-10-25 ENCOUNTER — TELEPHONE (OUTPATIENT)
Dept: ENDOCRINOLOGY | Facility: CLINIC | Age: 57
End: 2022-10-25

## 2022-10-25 LAB
25(OH)D3 SERPL-MCNC: 27 NG/ML (ref 30–100)
ALBUMIN UR-MCNC: <1.2 MG/DL
CREAT UR-MCNC: 187.4 MG/DL
HBA1C MFR BLD: 6.4 % (ref 4.8–5.6)
MICROALBUMIN/CREAT UR: NORMAL MG/G{CREAT}

## 2022-10-25 NOTE — TELEPHONE ENCOUNTER
----- Message from GEOVANI Miramontes sent at 10/25/2022  8:04 AM CDT -----  Please let him know urine test was negative for protein, vitamin D is 27 normals 30 and has improved it was previously 18 just make sure to take vitamin D replacement, A1c was 6.4% so at goal, thyroid level was normal

## 2022-11-30 ENCOUNTER — PATIENT MESSAGE (OUTPATIENT)
Dept: ENDOCRINOLOGY | Facility: CLINIC | Age: 57
End: 2022-11-30

## 2022-12-05 RX ORDER — PEN NEEDLE, DIABETIC 32GX 5/32"
NEEDLE, DISPOSABLE MISCELLANEOUS
Qty: 100 EACH | Refills: 14 | Status: SHIPPED | OUTPATIENT
Start: 2022-12-05

## 2023-01-03 RX ORDER — PROCHLORPERAZINE 25 MG/1
1 SUPPOSITORY RECTAL AS NEEDED
Qty: 9 EACH | Refills: 2 | Status: SHIPPED | OUTPATIENT
Start: 2023-01-03

## 2023-01-04 ENCOUNTER — DOCUMENTATION (OUTPATIENT)
Dept: ENDOCRINOLOGY | Facility: CLINIC | Age: 58
End: 2023-01-04
Payer: COMMERCIAL

## 2023-01-04 RX ORDER — INSULIN LISPRO 100 [IU]/ML
INJECTION, SOLUTION INTRAVENOUS; SUBCUTANEOUS
Qty: 20 ML | Refills: 7 | Status: SHIPPED | OUTPATIENT
Start: 2023-01-04

## 2023-01-10 RX ORDER — PROCHLORPERAZINE 25 MG/1
1 SUPPOSITORY RECTAL
Qty: 1 EACH | Refills: 2 | Status: SHIPPED | OUTPATIENT
Start: 2023-01-10

## 2023-02-23 ENCOUNTER — OFFICE VISIT (OUTPATIENT)
Dept: ENDOCRINOLOGY | Facility: CLINIC | Age: 58
End: 2023-02-23
Payer: COMMERCIAL

## 2023-02-23 VITALS
HEART RATE: 77 BPM | WEIGHT: 283.3 LBS | DIASTOLIC BLOOD PRESSURE: 90 MMHG | OXYGEN SATURATION: 77 % | BODY MASS INDEX: 40.56 KG/M2 | SYSTOLIC BLOOD PRESSURE: 150 MMHG | HEIGHT: 70 IN

## 2023-02-23 DIAGNOSIS — E78.2 MIXED HYPERLIPIDEMIA: ICD-10-CM

## 2023-02-23 DIAGNOSIS — I10 PRIMARY HYPERTENSION: ICD-10-CM

## 2023-02-23 DIAGNOSIS — Z79.4 TYPE 2 DIABETES MELLITUS WITH HYPERGLYCEMIA, WITH LONG-TERM CURRENT USE OF INSULIN: Primary | ICD-10-CM

## 2023-02-23 DIAGNOSIS — E55.9 VITAMIN D DEFICIENCY: ICD-10-CM

## 2023-02-23 DIAGNOSIS — E11.65 TYPE 2 DIABETES MELLITUS WITH HYPERGLYCEMIA, WITH LONG-TERM CURRENT USE OF INSULIN: Primary | ICD-10-CM

## 2023-02-23 LAB
EXPIRATION DATE: NORMAL
HBA1C MFR BLD: 6.8 %
Lab: 995

## 2023-02-23 PROCEDURE — 83036 HEMOGLOBIN GLYCOSYLATED A1C: CPT | Performed by: NURSE PRACTITIONER

## 2023-02-23 PROCEDURE — 99214 OFFICE O/P EST MOD 30 MIN: CPT | Performed by: NURSE PRACTITIONER

## 2023-02-23 RX ORDER — BLOOD-GLUCOSE SENSOR
1 EACH MISCELLANEOUS
Qty: 3 EACH | Refills: 11 | Status: SHIPPED | OUTPATIENT
Start: 2023-02-23

## 2023-02-23 RX ORDER — BLOOD-GLUCOSE,RECEIVER,CONT
1 EACH MISCELLANEOUS ONCE
Qty: 1 EACH | Refills: 0 | Status: SHIPPED | OUTPATIENT
Start: 2023-02-23 | End: 2023-02-23

## 2023-02-23 NOTE — PROGRESS NOTES
"Chief Complaint  Diabetes    Subjective          Jose Isaac presents to [facility] ENDOCRINOLOGY  Diabetes        In office visit     56-year-old male presents for follow-up     Reason diabetes mellitus type 2     Diagnosed in his 30s      Timing constant       Quality controlled      Severity high       Macrovascular complications--CAD        MI, stent      Microvascular complications --neuropathy , no DR , no renal disease            Current diabetes regimen         Oral medications, insulin         Current glucose monitoring      Fingerstick         8 times daily       Using dexcom       Could not download         Review of Systems - General ROS: negative        Vital Signs:   /90   Pulse 77   Ht 177.8 cm (70\")   Wt 129 kg (283 lb 4.8 oz)   SpO2 (!) 77%   BMI 40.65 kg/m²     Physical Exam  Constitutional:       Appearance: Normal appearance.   Cardiovascular:      Rate and Rhythm: Regular rhythm.      Heart sounds: Normal heart sounds.   Pulmonary:      Breath sounds: Normal breath sounds.   Musculoskeletal:      Cervical back: Normal range of motion.   Neurological:      Mental Status: He is alert.        Result Review :   The following data was reviewed by: GEOVANI Miramontes on 03/23/2022:  Common labs    Common Labs 3/23/22 3/23/22 3/23/22 3/23/22 10/24/22 10/24/22 10/24/22 10/24/22 10/24/22 2/23/23    1627 1627 1627 1639 1606 1606 1606 1606 1618    Glucose  89    99       BUN  17    16       Creatinine  0.83    0.94       Sodium  138    138       Potassium  4.3    4.1       Chloride  104    103       Calcium  8.8    9.4       Albumin  4.10    4.30       Total Bilirubin  0.2    0.4       Alkaline Phosphatase  70    78       AST (SGOT)  20    26       ALT (SGPT)  29    24       WBC 7.35    9.77        Hemoglobin 14.3    14.3        Hematocrit 42.4    43.5        Platelets 277    291        Total Cholesterol       314 (A)      Triglycerides       162 (A)  "     HDL Cholesterol       52      LDL Cholesterol        231 (A)      Hemoglobin A1C   6.80 (A)     6.40 (A)  6.8   Microalbumin, Urine    <1.2     <1.2    (A) Abnormal value                        Assessment and Plan    Diagnoses and all orders for this visit:    1. Type 2 diabetes mellitus with hyperglycemia, with long-term current use of insulin (HCC) (Primary)  -     POC Glycosylated Hemoglobin (Hb A1C)    2. Primary hypertension    3. Mixed hyperlipidemia    4. Vitamin D deficiency    Other orders  -     Continuous Blood Gluc  (Dexcom G7 ) device; 1 each 1 (One) Time for 1 dose.  Dispense: 1 each; Refill: 0  -     Continuous Blood Gluc Sensor (Dexcom G7 Sensor) misc; 1 each Every 10 (Ten) Days.  Dispense: 3 each; Refill: 11             Glycemic Management:        Diabetes mellitus type 2    Lab Results   Component Value Date    HGBA1C 6.8 02/23/2023        dexcom g6--      Could not download       No change      States he is allergic to Trulicity     Had trouble with jardiance         Taking Janumet 100/1000 mg daily --keep               Toujeo once daily---- 50 units         If am sugar is less than 80 decrease by 5 units         Humalog      1 unit per 5  CHO before each meal      Try to aim for 60 grams of carbohydrate per meal               Plus      Sliding scale     151-200      give 2 units   201-250      Give 4 units   251-300      Give 6 units   Above 301  Give 8 units         Goals for sugar     Fasting and before meals 80 to 130     2 hours after meals 180 or less        Aim for 60 grams of carbohydrate per meal     Aim for 15 grams of carbohydrate per snack                      Patient has to check 8 times daily , 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; he has difficulty getting blood from fingers needs cgm therapy to help control bg            Microvascular Complications Monitoring         Last eye exam---- May 2022 , no DR      Neuropathy --mild   Not on  RX                 Lipid Management:         Taking lipitor 20 mg one at night         Total Cholesterol   Date Value Ref Range Status   10/24/2022 314 (H) 0 - 200 mg/dL Final     Triglycerides   Date Value Ref Range Status   10/24/2022 162 (H) 0 - 150 mg/dL Final     HDL Cholesterol   Date Value Ref Range Status   10/24/2022 52 40 - 60 mg/dL Final     LDL Cholesterol    Date Value Ref Range Status   10/24/2022 231 (H) 0 - 100 mg/dL Final                    Blood Pressure Management:        Taking lisinopril 25 mg daily         Thyroid Health     Lab Results   Component Value Date    TSH 1.940 10/24/2022                   Bone Health                   Lab Results   Component Value Date     CALCIUM 9.6 04/12/2021                 Preventive Care:      Non smoker                     Follow Up   No follow-ups on file.  Patient was given instructions and counseling regarding his condition or for health maintenance advice. Please see specific information pulled into the AVS if appropriate.         This document has been electronically signed by GEOVANI Miramontes on February 23, 2023 16:04 CST.

## 2023-08-17 RX ORDER — INSULIN LISPRO 100 [IU]/ML
INJECTION, SOLUTION INTRAVENOUS; SUBCUTANEOUS
Qty: 15 ML | Refills: 7 | Status: SHIPPED | OUTPATIENT
Start: 2023-08-17